# Patient Record
Sex: MALE | Race: OTHER | Employment: UNEMPLOYED | URBAN - NONMETROPOLITAN AREA
[De-identification: names, ages, dates, MRNs, and addresses within clinical notes are randomized per-mention and may not be internally consistent; named-entity substitution may affect disease eponyms.]

---

## 2021-05-19 ENCOUNTER — HOSPITAL ENCOUNTER (EMERGENCY)
Age: 51
Discharge: HOME | End: 2021-05-20
Attending: EMERGENCY MEDICINE

## 2021-05-19 ENCOUNTER — APPOINTMENT (OUTPATIENT)
Dept: GENERAL RADIOLOGY | Age: 51
End: 2021-05-19

## 2021-05-19 DIAGNOSIS — J20.8 ACUTE BRONCHITIS DUE TO OTHER SPECIFIED ORGANISMS: Primary | ICD-10-CM

## 2021-05-19 DIAGNOSIS — J06.9 ACUTE UPPER RESPIRATORY INFECTION: ICD-10-CM

## 2021-05-19 DIAGNOSIS — R05.9 COUGH: ICD-10-CM

## 2021-05-19 PROCEDURE — 87635 SARS-COV-2 COVID-19 AMP PRB: CPT

## 2021-05-19 PROCEDURE — 71045 X-RAY EXAM CHEST 1 VIEW: CPT

## 2021-05-19 PROCEDURE — 99285 EMERGENCY DEPT VISIT HI MDM: CPT

## 2021-05-19 RX ORDER — IPRATROPIUM BROMIDE AND ALBUTEROL SULFATE 2.5; .5 MG/3ML; MG/3ML
1 SOLUTION RESPIRATORY (INHALATION) ONCE
Status: COMPLETED | OUTPATIENT
Start: 2021-05-19 | End: 2021-05-20

## 2021-05-19 RX ORDER — PREDNISONE 20 MG/1
60 TABLET ORAL ONCE
Status: COMPLETED | OUTPATIENT
Start: 2021-05-19 | End: 2021-05-20

## 2021-05-19 ASSESSMENT — PAIN DESCRIPTION - DESCRIPTORS: DESCRIPTORS: TIGHTNESS

## 2021-05-19 ASSESSMENT — PAIN DESCRIPTION - LOCATION: LOCATION: CHEST

## 2021-05-19 NOTE — LETTER
[facility] Emergency Department  [address] [place] [address]  Phone: [phone]  Fax: [phone]               May 20, 2021    Patient: Leona Dominguez   YOB: 1970   Date of Visit: 5/19/2021       To Whom It May Concern:    Leona Dominguez was seen and treated in our emergency department on 5/19/2021. He may return to work on 05/21/2020.       Sincerely,       Miguel Adam RN         Signature:__________________________________

## 2021-05-20 VITALS
TEMPERATURE: 99.1 F | WEIGHT: 246 LBS | DIASTOLIC BLOOD PRESSURE: 75 MMHG | BODY MASS INDEX: 35.22 KG/M2 | SYSTOLIC BLOOD PRESSURE: 108 MMHG | HEIGHT: 70 IN | HEART RATE: 66 BPM | OXYGEN SATURATION: 99 % | RESPIRATION RATE: 8 BRPM

## 2021-05-20 LAB
SARS-COV-2, NAAT: NOT DETECTED
SOURCE: NORMAL

## 2021-05-20 PROCEDURE — 94640 AIRWAY INHALATION TREATMENT: CPT

## 2021-05-20 PROCEDURE — 6370000000 HC RX 637 (ALT 250 FOR IP): Performed by: EMERGENCY MEDICINE

## 2021-05-20 RX ORDER — BROMPHENIRAMINE MALEATE, PSEUDOEPHEDRINE HYDROCHLORIDE, AND DEXTROMETHORPHAN HYDROBROMIDE 2; 30; 10 MG/5ML; MG/5ML; MG/5ML
5 SYRUP ORAL 4 TIMES DAILY PRN
Qty: 1 BOTTLE | Refills: 0 | Status: SHIPPED | OUTPATIENT
Start: 2021-05-20 | End: 2022-03-14 | Stop reason: ALTCHOICE

## 2021-05-20 RX ORDER — AZITHROMYCIN 250 MG/1
TABLET, FILM COATED ORAL
Qty: 1 PACKET | Refills: 0 | Status: SHIPPED | OUTPATIENT
Start: 2021-05-20 | End: 2021-05-30

## 2021-05-20 RX ORDER — METHYLPREDNISOLONE 4 MG/1
TABLET ORAL
Qty: 1 KIT | Refills: 0 | Status: SHIPPED | OUTPATIENT
Start: 2021-05-20 | End: 2022-03-14 | Stop reason: ALTCHOICE

## 2021-05-20 RX ORDER — ALBUTEROL SULFATE 90 UG/1
2 AEROSOL, METERED RESPIRATORY (INHALATION) 4 TIMES DAILY PRN
Qty: 1 INHALER | Refills: 0 | Status: SHIPPED | OUTPATIENT
Start: 2021-05-20 | End: 2022-03-14 | Stop reason: ALTCHOICE

## 2021-05-20 RX ORDER — AZITHROMYCIN 250 MG/1
500 TABLET, FILM COATED ORAL ONCE
Status: COMPLETED | OUTPATIENT
Start: 2021-05-20 | End: 2021-05-20

## 2021-05-20 RX ADMIN — IPRATROPIUM BROMIDE AND ALBUTEROL SULFATE 1 AMPULE: .5; 3 SOLUTION RESPIRATORY (INHALATION) at 00:13

## 2021-05-20 RX ADMIN — AZITHROMYCIN MONOHYDRATE 500 MG: 250 TABLET ORAL at 01:04

## 2021-05-20 RX ADMIN — PREDNISONE 60 MG: 20 TABLET ORAL at 00:44

## 2021-05-20 ASSESSMENT — ENCOUNTER SYMPTOMS
WHEEZING: 1
SORE THROAT: 1
SWOLLEN GLANDS: 0
EYES NEGATIVE: 1
COUGH: 1
GASTROINTESTINAL NEGATIVE: 1
RHINORRHEA: 1
SINUS PAIN: 1

## 2021-05-20 NOTE — ED PROVIDER NOTES
The history is provided by the patient. URI  Presenting symptoms: congestion, cough, fever, rhinorrhea and sore throat    Severity:  Moderate  Onset quality:  Gradual  Timing:  Constant  Chronicity:  New  Relieved by:  Nothing  Worsened by:  Nothing  Ineffective treatments:  None tried  Associated symptoms: sinus pain, sneezing and wheezing    Associated symptoms: no arthralgias, no headaches, no myalgias, no neck pain and no swollen glands    Associated symptoms comment:  Chest tightness and cough since receiving 1st dose of COVID vaccine x1 week ago. Patient states he has felt hot but has not measured temperature      Review of Systems   Constitutional: Positive for fever. HENT: Positive for congestion, rhinorrhea, sinus pain, sneezing and sore throat. Eyes: Negative. Respiratory: Positive for cough and wheezing. Cardiovascular: Negative. Gastrointestinal: Negative. Genitourinary: Negative. Musculoskeletal: Negative. Negative for arthralgias, myalgias and neck pain. Skin: Negative. Neurological: Negative. Negative for headaches. All other systems reviewed and are negative. History reviewed. No pertinent family history.   Social History     Socioeconomic History    Marital status:      Spouse name: Not on file    Number of children: Not on file    Years of education: Not on file    Highest education level: Not on file   Occupational History    Not on file   Tobacco Use    Smoking status: Never Smoker    Smokeless tobacco: Never Used   Vaping Use    Vaping Use: Never used   Substance and Sexual Activity    Alcohol use: Never    Drug use: Never    Sexual activity: Not on file   Other Topics Concern    Not on file   Social History Narrative    Not on file     Social Determinants of Health     Financial Resource Strain:     Difficulty of Paying Living Expenses:    Food Insecurity:     Worried About Running Out of Food in the Last Year:     [address] in the Last Year:    Transportation Needs:     Lack of Transportation (Medical):  Lack of Transportation (Non-Medical):    Physical Activity:     Days of Exercise per Week:     Minutes of Exercise per Session:    Stress:     Feeling of Stress :    Social Connections:     Frequency of Communication with Friends and Family:     Frequency of Social Gatherings with Friends and Family:     Attends Holiness Services:     Active Member of Clubs or Organizations:     Attends Club or Organization Meetings:     Marital Status:    Intimate Partner Violence:     Fear of Current or Ex-Partner:     Emotionally Abused:     Physically Abused:     Sexually Abused:      History reviewed. No pertinent surgical history. History reviewed. No pertinent past medical history. No Known Allergies  Prior to Admission medications    Medication Sig Start Date End Date Taking? Authorizing Provider   azithromycin (ZITHROMAX) 250 MG tablet Take 2 tablets (500 mg) on Day 1, followed by 1 tablet (250 mg) once daily on Days 2 through 5. 5/20/21 5/30/21 Yes Cuong Plaster, DO   brompheniramine-pseudoephedrine-DM 2-30-10 MG/5ML syrup Take 5 mLs by mouth 4 times daily as needed for Congestion or Cough 5/20/21  Yes Migdalia Rodriguez DO   albuterol sulfate HFA (VENTOLIN HFA) 108 (90 Base) MCG/ACT inhaler Inhale 2 puffs into the lungs 4 times daily as needed for Wheezing 5/20/21  Yes Migdalia Rodriguez DO   methylPREDNISolone (MEDROL, SRAAI,) 4 MG tablet Take by mouth. 5/20/21  Yes Migdalia Rodriguez DO       /63   Pulse 78   Temp 99.1 °F (37.3 °C) (Oral)   Resp 15   Ht 5' 10\" (1.778 m)   Wt 246 lb (111.6 kg)   SpO2 96%   BMI 35.30 kg/m²     Physical Exam  Vitals and nursing note reviewed. Constitutional:       Appearance: He is well-developed. HENT:      Head: Normocephalic and atraumatic. Right Ear: External ear normal.      Left Ear: External ear normal.      Nose: Mucosal edema and rhinorrhea present.    Eyes: Conjunctiva/sclera: Conjunctivae normal.      Pupils: Pupils are equal, round, and reactive to light. Cardiovascular:      Rate and Rhythm: Normal rate and regular rhythm. Heart sounds: Normal heart sounds. Pulmonary:      Effort: Pulmonary effort is normal.      Breath sounds: Decreased air movement present. Decreased breath sounds and wheezing present. Comments: Wheezing clears with cough  Abdominal:      General: Bowel sounds are normal.      Palpations: Abdomen is soft. Musculoskeletal:         General: Normal range of motion. Cervical back: Normal range of motion and neck supple. Skin:     General: Skin is warm and dry. Neurological:      Mental Status: He is alert and oriented to person, place, and time. GCS: GCS eye subscore is 4. GCS verbal subscore is 5. GCS motor subscore is 6. Psychiatric:         Behavior: Behavior normal.         Thought Content: Thought content normal.         Judgment: Judgment normal.         MDM:    Labs Reviewed   COVID-19, RAPID       XR CHEST PORTABLE   Preliminary Result   No focal consolidation. Duoneb in ER and prednisone. Supportive care and the patient feels improved. My typical dicussion, presentation,and considerations for this patients' chief complaint, diagnosis, and differential diagnosis have been considered and discussed. I have stressed need for follow up and reexamination for this encounter. The patient  was informed to follow up [facility] The patient  was also told to return to the emergency department if any changes or any concern. Patient  questions and concerns from this visit have been addressed prior to discharge. Patient was  prescribed medication. The medication(s) use,  medication(s) safety and medication(s) interactions with already prescribed medication(s) have been explained and outlined for this encounter.  The patient  was educated that it is their responsibility to verify this information is correct at the time

## 2021-05-20 NOTE — ED TRIAGE NOTES
Arrived ambulatory to room 1 for triage. Tolerated without difficulty. Bed in lowest position. Call light given. Gowned for exam, placed on cardiac monitor.

## 2021-08-12 ENCOUNTER — HOSPITAL ENCOUNTER (OUTPATIENT)
Dept: PHYSICAL THERAPY | Age: 51
Setting detail: THERAPIES SERIES
Discharge: HOME | End: 2021-08-12
Payer: COMMERCIAL

## 2021-08-12 PROCEDURE — 97161 PT EVAL LOW COMPLEX 20 MIN: CPT

## 2021-08-12 ASSESSMENT — PAIN DESCRIPTION - PAIN TYPE: TYPE: ACUTE PAIN

## 2021-08-12 ASSESSMENT — PAIN SCALES - GENERAL: PAINLEVEL_OUTOF10: 5

## 2021-08-13 ASSESSMENT — PAIN DESCRIPTION - PAIN TYPE: TYPE: ACUTE PAIN

## 2021-08-13 ASSESSMENT — PAIN DESCRIPTION - LOCATION: LOCATION: BACK

## 2021-08-13 ASSESSMENT — PAIN - FUNCTIONAL ASSESSMENT: PAIN_FUNCTIONAL_ASSESSMENT: PREVENTS OR INTERFERES SOME ACTIVE ACTIVITIES AND ADLS

## 2021-08-13 ASSESSMENT — PAIN DESCRIPTION - FREQUENCY: FREQUENCY: CONTINUOUS

## 2021-08-13 ASSESSMENT — PAIN DESCRIPTION - ORIENTATION: ORIENTATION: LOWER

## 2021-08-13 ASSESSMENT — PAIN SCALES - GENERAL: PAINLEVEL_OUTOF10: 5

## 2021-08-13 ASSESSMENT — PAIN DESCRIPTION - PROGRESSION: CLINICAL_PROGRESSION: NOT CHANGED

## 2021-08-13 NOTE — FLOWSHEET NOTE
providers:        Assessment:  (Response towards treatment session) (Pain Rating)      Pt tolerated  treatment without any adverse reactions or complications this date.  . Pt would continue to benefit from skilled therapy interventions to address remaining impairments, improve mobility and strength,  and progress toward goal completion and prepare for d/c including finalizing HEP ; .  Pain complaints after session 5/10      Plan for Next Session:  bridges; prone hip EXT      Time In / Time Out:           If BWC Please Indicate Time In/Out/Total Time  CPT Code Time in Time out Total Time   PT oriana  930  1012  42                                                   Total for session      42       Timed Code/Total Treatment Minutes:  PT eval 43'    Next Progress Note due:        Plan of Care Interventions:  [x] Therapeutic Exercise  [] Modalities:  [x] Therapeutic Activity     [] Ultrasound  [] Estim  [] Gait Training      [] Cervical Traction [] Lumbar Traction  [x] Neuromuscular Re-education    [] Cold/hotpack [] Iontophoresis   [x] Instruction in HEP      [] Vasopneumatic   [] Dry Needling    X Manual Therapy               [] Aquatic Therapy              Electronically signed by:  Veronica Mabry PT, 8/13/2021, 1:42 PM

## 2021-08-13 NOTE — PROGRESS NOTES
Physical Therapy  Initial Assessment  Date: 2021- eval performed 21  Patient Name: Deepthi Echeverria  MRN: [id number]  : 1970     Treatment Diagnosis: LBP    Restrictions  Position Activity Restriction  Other position/activity restrictions: no lifting more than 10#, no pushing/pulling more than 10#; limits forward bending    Subjective   General  Chart Reviewed: Yes  Patient assessed for rehabilitation services?: Yes  Referring Practitioner: Wilmer Woody  Diagnosis: lumbar strain  Follows Commands: Within Functional Limits  General Comment  Comments: L shoulder  PT Visit Information  PT Insurance Information: [facility]  Subjective  Subjective: onset of LBP - related to lifting boxes @ [employer] started about 2 1.2 to 3 weeks ago-; has been working @ [employer] for about 3 months-no overall improvement @ this time; - temporary relief with meds from doctor and ice pack  Pain Screening  Patient Currently in Pain: Yes  Pain Assessment  Pain Assessment: 0-10  Pain Level: 5  Pain Type: Acute pain  Pain Location: Back  Pain Orientation: Lower  Pain Frequency: Continuous  Clinical Progression: Not changed  Functional Pain Assessment: Prevents or interferes some active activities and ADLs  Vital Signs  Patient Currently in Pain: Yes    Vision/Hearing  Vision  Vision: Within Functional Limits  Hearing  Hearing: Within functional limits    Orientation  Orientation  Overall Orientation Status: Within Normal Limits    Social/Functional History       Objective     Observation/Palpation  Posture: Fair  Palpation: lumbar spine TTP    PROM RLE (degrees)  RLE General PROM: hip FLEX painful  PROM LLE (degrees)  LLE General PROM: hip FLEX painful                           Ambulation  Ambulation?: Yes  Ambulation 1  Device: No Device  Gait Deviations: None                            Assessment   Conditions Requiring Skilled Therapeutic Intervention  Body structures, Functions, Activity limitations: Increased pain;Decreased ROM  Treatment

## 2021-08-13 NOTE — PLAN OF CARE
Outpatient Physical Therapy           [place]           [] Phone: [phone]   Fax: [phone]  [place]           [x] Phone: [phone]   Fax: [phone]     To: Referring Practitioner: Ryder Kathleen    From: Stacie Hedrick PT, PT     Patient: Dulce Hassan       : 1970  Diagnosis: S39.012A low back strain  Treatment Diagnosis: Treatment Diagnosis: LBP   Date: 2021    Physical Therapy Certification/Re-Certification Form    The following patient has been evaluated for physical therapy services and for therapy to continue, insurance requires physician review of the treatment plan initially and every 90 days. Please review the attached evaluation and/or summary of the patient's plan of care, and verify that you agree therapy should continue by signing the attached document and sending it back to our office.     Assessment:      patient injured back @ work- now working with restrictions- patient prefers exercise with extension bias  Plan of Care/Treatment to date:  [x] Therapeutic Exercise  [] Modalities:  [x] Therapeutic Activity     [] Ultrasound  [] Electrical Stimulation  [] Gait Training      [] Cervical Traction [] Lumbar Traction  [x] Neuromuscular Re-education    [] Cold/hotpack [] Iontophoresis   [x] Instruction in HEP      [] Vasopneumatic    [] Dry Needling  [x] Manual Therapy               [] Aquatic Therapy       Other:          Frequency/Duration:  # Days per week: [] 1 day # Weeks: [x] 10     [x] 2 days   []      [x] 3 days   []      [] 4 days   []         []          []     Rehab Potential/Progress: [] Excellent [x] Good [] Fair  [] Poor     Goals:       Short term goals  Time Frame for Short term goals: 10  sessions  Short term goal 1: patient will report LBP no greater than 1/10 lbp  Short term goal 2: patient will be compliant with HEP         Electronically signed by:  Stacie Hedrick PT, , 2021, 1:25 PM        If you have any questions or concerns, please don't hesitate to call.  Thank you for your referral.      Physician Signature:________________________________Date:_________ TIME: _____  By signing above, therapists plan is approved by physician

## 2021-08-16 ENCOUNTER — HOSPITAL ENCOUNTER (OUTPATIENT)
Dept: PHYSICAL THERAPY | Age: 51
Setting detail: THERAPIES SERIES
Discharge: HOME | End: 2021-08-16
Payer: COMMERCIAL

## 2021-08-16 PROCEDURE — 97110 THERAPEUTIC EXERCISES: CPT

## 2021-08-16 NOTE — FLOWSHEET NOTE
Outpatient Physical Therapy  [place]           [] Phone: [phone]   Fax: [phone]  [place] diaz           [x] Phone: [phone]   Fax: [phone]        Physical Therapy Daily Treatment Note  Date:  2021    Patient Name:  Yonathan Mckeon    :  1970  MRN: [id number]  Restrictions/Precautions: Other position/activity restrictions: no lifting more than 10#, no pushing/pulling more than 10#; limits forward bending  Diagnosis: S39.012A low back strain  Treatment Diagnosis: Treatment Diagnosis: LBP   Insurance/Certification information: PT Insurance Information: [facility] 10 session by 21  Referring Physician:  Referring Practitioner: Bradford Wilde  Next Doctor Visit:    Plan of care signed (Y/N):    Outcome Measure:   Visit# / total visits:  2/10  Pain level: 7/10   Goals:       Time Frame for Short term goals: 10  sessions  Short term goal 1: patient will report LBP no greater than 1/10 lbp  Short term goal 2: patient will be compliant with HEP         Summary of Evaluation:       patient injured back @ work- now working with restrictions- patient prefers exercise with extension bias    Subjective:   Patient reports of 7/10 pain upon arrival and voices no new c/o. Any changes in Ambulatory Summary Sheet? None        Objective:   Discomfort with TM noted    COVID screening questions were asked and patient attested that there had been no contact or symptoms        Exercises: (No more than 4 columns)   Exercise/Equipment Date 21 Date 2021 Date      PT eval, HEP instruct     WARM UP      Treadmill     2. 5mph 5'          TABLE      Prone prop  5'    Prone press ups  10x    Prone hip ext  10x B    Quadriped oppos arm/leg  10x B    Bridge   10x                                                  STANDING      Scap ret  GTB 10x3\"    LAE  GTB 10x3\"    Lat pull   GTB 10x3\"                                                               PROPRIOCEPTION                                    MODALITIES

## 2021-08-23 ENCOUNTER — HOSPITAL ENCOUNTER (OUTPATIENT)
Dept: PHYSICAL THERAPY | Age: 51
Setting detail: THERAPIES SERIES
Discharge: HOME | End: 2021-08-23
Payer: COMMERCIAL

## 2021-08-23 PROCEDURE — 97110 THERAPEUTIC EXERCISES: CPT

## 2021-08-23 NOTE — FLOWSHEET NOTE
laps ea way                                                  PROPRIOCEPTION                                    MODALITIES                      Other Therapeutic Activities/Education:        Home Exercise Program:  Prone prop twice daily @ home; focus on lumbar lordosis when sitting, frequent standing BB when @ work- patient expressed understanding      Manual Treatments:        Modalities:        Communication with other providers:        Assessment:  (Response towards treatment session) (Pain Rating)         Pt tolerated  treatment without any adverse reactions or complications this date.  . Pt would continue to benefit from skilled therapy interventions to address remaining impairments, improve mobility and strength,  and progress toward goal completion and prepare for d/c including finalizing HEP ; .  Pain complaints after session 7/10      Plan for Next Session:  bridges; prone hip EXT      Time In / Time Out:   0928/1006       If BWC Please Indicate Time In/Out/Total Time  CPT Code Time in Time out Total Time   TE  0928 1006   38                                                   Total for session       38       Timed Code/Total Treatment Minutes:  38te     Next Progress Note due:        Plan of Care Interventions:  [x] Therapeutic Exercise  [] Modalities:  [x] Therapeutic Activity     [] Ultrasound  [] Estim  [] Gait Training      [] Cervical Traction [] Lumbar Traction  [x] Neuromuscular Re-education    [] Cold/hotpack [] Iontophoresis   [x] Instruction in HEP      [] Vasopneumatic   [] Dry Needling    X Manual Therapy               [] Aquatic Therapy              Electronically signed by:  Kristen Villasenor 8/23/2021, 9:28 AM

## 2021-08-24 ENCOUNTER — HOSPITAL ENCOUNTER (OUTPATIENT)
Dept: PHYSICAL THERAPY | Age: 51
Setting detail: THERAPIES SERIES
Discharge: HOME | End: 2021-08-24
Payer: COMMERCIAL

## 2021-08-24 PROCEDURE — 97110 THERAPEUTIC EXERCISES: CPT

## 2021-08-24 NOTE — FLOWSHEET NOTE
Outpatient Physical Therapy  [place]           [] Phone: [phone]   Fax: [phone]  [place] Marielena           [x] Phone: [phone]   Fax: [phone]        Physical Therapy Daily Treatment Note  Date:  2021    Patient Name:  Melania Campbell  :  1970  MRN: [id number]  Restrictions/Precautions: Other position/activity restrictions: no lifting more than 10#, no pushing/pulling more than 10#; limits forward bending  Diagnosis: S39.012A low back strain  Treatment Diagnosis: Treatment Diagnosis: LBP   Insurance/Certification information: PT Insurance Information: [facility] 10 session by 21  Referring Physician:  Referring Practitioner: Abi Ennis Doctor Visit:    Plan of care signed (Y/N):    Outcome Measure:   Visit# / total visits:  4/10  Pain level: 7/10   Goals:       Time Frame for Short term goals: 10  sessions  Short term goal 1: patient will report LBP no greater than 1/10 lbp  Short term goal 2: patient will be compliant with HEP         Summary of Evaluation:       patient injured back @ work- now working with restrictions- patient prefers exercise with extension bias    Subjective:   Patient reports of 7/10 pain upon arrival and voices no new c/o. Any changes in Ambulatory Summary Sheet? None        Objective:  No change in pain at end of session    COVID screening questions were asked and patient attested that there had been no contact or symptoms        Exercises: (No more than 4 columns)   Exercise/Equipment Date 21 Date 2021 Date 2021      PT eval, HEP instruct      WARM UP       Treadmill     2. 5mph 5' 2. 3mph  5' 7'  2.5mph          TABLE       Prone prop  5' 5' 5'   Prone press ups  10x 2x10 2x10   Prone hip ext  10x B 2x10 B 2x10 B   Quadriped oppos arm/leg  10x B 10x B 10x B   Bridge   10x  2x10 2x10                                                       STANDING       Scap ret  GTB 10x3\" GTB 2x10 3\" PTT 2x10 3\"   LAE  GTB 10x3\" GTB 2x10

## 2021-10-13 ENCOUNTER — HOSPITAL ENCOUNTER (OUTPATIENT)
Dept: PHYSICAL THERAPY | Age: 51
Setting detail: THERAPIES SERIES
Discharge: HOME | End: 2021-10-13
Payer: COMMERCIAL

## 2021-10-13 PROCEDURE — 97110 THERAPEUTIC EXERCISES: CPT

## 2021-10-13 NOTE — PROGRESS NOTES
Physical Therapy  [facility] extended PT to 10/31/21.  Sharon Cohen, PT, 10/13/2021,  9:00 AM

## 2021-10-13 NOTE — FLOWSHEET NOTE
Outpatient Physical Therapy  [place]           [] Phone: [phone]   Fax: [phone]  [place]           [x] Phone: [phone]   Fax: [phone]        Physical Therapy Daily Treatment Note  Date:  10/13/2021    Patient Name:  Yung Ram  :  1970  MRN: [id number]  Restrictions/Precautions: Other position/activity restrictions: no lifting more than 10#, no pushing/pulling more than 10#; limits forward bending  Diagnosis: S39.012A low back strain  Treatment Diagnosis: Treatment Diagnosis: LBP   Insurance/Certification information: PT Insurance Information: [id number] 10 session by 10/31/21  Referring Physician:  Referring Practitioner: Jayson Funk  Next Doctor Visit:    Plan of care signed (Y/N):    Outcome Measure:   Visit# / total visits: 5/10  Pain level: 4/10   Goals:       Time Frame for Short term goals: 10  sessions  Short term goal 1: patient will report LBP no greater than 1/10 lbp  Short term goal 2: patient will be compliant with HEP         Summary of Evaluation:       patient injured back @ work- now working with restrictions- patient prefers exercise with extension bias    Subjective:   Patient reports of 4/10 pain upon arrival and reports he's having good days and bad days, but the bad is out weighing the good right now. Any changes in Ambulatory Summary Sheet? None        Objective: Increase pain noted at end of session    COVID screening questions were asked and patient attested that there had been no contact or symptoms        Exercises: (No more than 4 columns)   Exercise/Equipment Date 2021 2021 10/13/21           WARM UP      Treadmill    2. 3mph  5' 7'  2.5mph 7'  2.5mph         TABLE      Prone prop 5' 5' 5'   Prone press ups 2x10 2x10 2x10    Prone hip ext 2x10 B 2x10 B 2x10 B   Quadriped oppos arm/leg 10x B 10x B 10x B   Bridge  2x10 2x10 2x10                                                STANDING      Scap ret GTB 2x10 3\" PTT 2x10 3\" PTT 2x10 3\" LAE GTB 2x10 3\" PTT 2x10 3\" PTT 2x10 3\"   Lat pull  GTB 2x10 3\" PTT 2x10 3\" PTT 2x10 3\"         FM lateral walk outs 10# 5 laps ea way 10# 5 laps ea way 10# 5 laps ea way                                                  PROPRIOCEPTION                                    MODALITIES                      Other Therapeutic Activities/Education:        Home Exercise Program:  Prone prop twice daily @ home; focus on lumbar lordosis when sitting, frequent standing BB when @ work- patient expressed understanding      Manual Treatments:        Modalities:        Communication with other providers:        Assessment:  (Response towards treatment session) (Pain Rating)         Pt tolerated  treatment without any adverse reactions or complications this date.  . Pt would continue to benefit from skilled therapy interventions to address remaining impairments, improve mobility and strength,  and progress toward goal completion and prepare for d/c including finalizing HEP ; .  Pain complaints after session 6-7/10      Plan for Next Session:  hector; prone hip EXT      Time In / Time Out:   2452/8014       If BWC Please Indicate Time In/Out/Total Time  CPT Code Time in Time out Total Time   TE  1602 1634       32                                                   Total for session               Timed Code/Total Treatment Minutes:   32te    Next Progress Note due:        Plan of Care Interventions:  [x] Therapeutic Exercise  [] Modalities:  [x] Therapeutic Activity     [] Ultrasound  [] Estim  [] Gait Training      [] Cervical Traction [] Lumbar Traction  [x] Neuromuscular Re-education    [] Cold/hotpack [] Iontophoresis   [x] Instruction in HEP      [] Vasopneumatic   [] Dry Needling      Manual Therapy               [] Aquatic Therapy              Electronically signed by:  Robert Ponce PT 10/13/2021, 4:02 PM

## 2021-10-15 ENCOUNTER — HOSPITAL ENCOUNTER (OUTPATIENT)
Dept: PHYSICAL THERAPY | Age: 51
Setting detail: THERAPIES SERIES
Discharge: HOME | End: 2021-10-15
Payer: COMMERCIAL

## 2021-10-15 PROCEDURE — 97110 THERAPEUTIC EXERCISES: CPT

## 2021-10-15 NOTE — FLOWSHEET NOTE
Outpatient Physical Therapy  [place]           [] Phone: [phone]   Fax: [phone]  Lavonne Gonzáles           [x] Phone: [phone]   Fax: [phone]        Physical Therapy Daily Treatment Note  Date:  10/15/2021    Patient Name:  Jovan Rueda  :  1970  MRN: [id number]  Restrictions/Precautions: Other position/activity restrictions: no lifting more than 10#, no pushing/pulling more than 10#; limits forward bending  Diagnosis: S39.012A low back strain  Treatment Diagnosis: Treatment Diagnosis: LBP   Insurance/Certification information: PT Insurance Information: [facility] 10 session by 10/31/21  Referring Physician:  Referring Practitioner: Eden Roberson  Next Doctor Visit:    Plan of care signed (Y/N):    Outcome Measure:   Visit# / total visits: 6/10  Pain level: 6/10   Goals:       Time Frame for Short term goals: 10  sessions  Short term goal 1: patient will report LBP no greater than 1/10 lbp  Short term goal 2: patient will be compliant with HEP         Summary of Evaluation:       patient injured back @ work- now working with restrictions- patient prefers exercise with extension bias    Subjective:   Patient reports of 6/10 pain upon arrival and reports he worked today though as well. Any changes in Ambulatory Summary Sheet? None        Objective: Increase pain noted at end of session    COVID screening questions were asked and patient attested that there had been no contact or symptoms        Exercises: (No more than 4 columns)   Exercise/Equipment Date 2021 2021 10/13/21 10/15/21            WARM UP       Treadmill    2. 3mph  5' 7'  2.5mph 7'  2.5mph 8' 2.5mph          TABLE       Prone prop 5' 5' 5' 5'   Prone press ups 2x10 2x10 2x10  2x10    Prone hip ext 2x10 B 2x10 B 2x10 B 2x10 B   Quadriped oppos arm/leg 10x B 10x B 10x B 10x B   Bridge  2x10 2x10 2x10 2x10                                                       STANDING       Scap ret GTB 2x10 3\" PTT 2x10 3\" PTT 2x10 3\" PTT 2x10 3\"   LAE GTB 2x10 3\" PTT 2x10 3\" PTT 2x10 3\" PTT 2x10 3\"   Lat pull  GTB 2x10 3\" PTT 2x10 3\" PTT 2x10 3\" PTT 2x10 3\"          FM lateral walk outs 10# 5 laps ea way 10# 5 laps ea way 10# 5 laps ea way 10# 10 laps ea way                                                         PROPRIOCEPTION                                          MODALITIES                         Other Therapeutic Activities/Education:        Home Exercise Program:  Prone prop twice daily @ home; focus on lumbar lordosis when sitting, frequent standing BB when @ work- patient expressed understanding      Manual Treatments:        Modalities:        Communication with other providers:        Assessment:  (Response towards treatment session) (Pain Rating)         Pt tolerated  treatment without any adverse reactions or complications this date.  . Pt would continue to benefit from skilled therapy interventions to address remaining impairments, improve mobility and strength,  and progress toward goal completion and prepare for d/c including finalizing HEP ; .  Pain complaints after session 6-7/10      Plan for Next Session:  hector; prone hip EXT      Time In / Time Out:   6050/6081       If [facility] Please Indicate Time In/Out/Total Time  CPT Code Time in Time out Total Time   TE  7571 5729      39                                                     Total for session       39te        Timed Code/Total Treatment Minutes:   39te     Next Progress Note due:        Plan of Care Interventions:  [x] Therapeutic Exercise  [] Modalities:  [x] Therapeutic Activity     [] Ultrasound  [] Estim  [] Gait Training      [] Cervical Traction [] Lumbar Traction  [x] Neuromuscular Re-education    [] Cold/hotpack [] Iontophoresis   [x] Instruction in HEP      [] Vasopneumatic   [] Dry Needling      Manual Therapy               [] Aquatic Therapy              Electronically signed by:  Alesia Martinez  10/15/2021, 4:05 PM

## 2021-10-18 ENCOUNTER — HOSPITAL ENCOUNTER (OUTPATIENT)
Dept: PHYSICAL THERAPY | Age: 51
Setting detail: THERAPIES SERIES
Discharge: HOME | End: 2021-10-18
Payer: COMMERCIAL

## 2021-10-18 PROCEDURE — 97110 THERAPEUTIC EXERCISES: CPT

## 2021-10-18 NOTE — FLOWSHEET NOTE
Outpatient Physical Therapy  [place]           [] Phone: [phone]   Fax: [phone]  Rosaline diaz           [x] Phone: [phone]   Fax: [phone]        Physical Therapy Daily Treatment Note  Date:  10/18/2021    Patient Name:  Benson Bunn  :  1970  MRN: [id number]  Restrictions/Precautions: Other position/activity restrictions: no lifting more than 10#, no pushing/pulling more than 10#; limits forward bending  Diagnosis: S39.012A low back strain  Treatment Diagnosis: Treatment Diagnosis: LBP   Insurance/Certification information: PT Insurance Information: [facility] 10 session by 10/31/21  Referring Physician:  Referring Practitioner: Clarisse Ennis Doctor Visit:    Plan of care signed (Y/N):    Outcome Measure:   Visit# / total visits: 7/10  Pain level: 3-4/10   Goals:       Time Frame for Short term goals: 10  sessions  Short term goal 1: patient will report LBP no greater than 1/10 lbp  Short term goal 2: patient will be compliant with HEP         Summary of Evaluation:       patient injured back @ work- now working with restrictions- patient prefers exercise with extension bias    Subjective:   Patient reports of 6/10 pain upon arrival and reports he worked today though as well. Any changes in Ambulatory Summary Sheet?   None        Objective:   Less pain when up and mobile unless he's to busy then his pain goes up   But when he's sedentary to long he has more pain     COVID screening questions were asked and patient attested that there had been no contact or symptoms        Exercises: (No more than 4 columns)   Exercise/Equipment 10/13/21 10/15/21 10/18/21           WARM UP      Treadmill    7'  2.5mph 8' 2.5mph 9' 2.5mph         TABLE      Prone prop 5' 5' 5'   Prone press ups 2x10  2x10  2x10   Prone hip ext 2x10 B 2x10 B 2x10 B   Quadriped oppos arm/leg 10x B 10x B 10x B   Bridge  2x10 2x10 2x10                                                STANDING      Scap ret PTT 2x10 3\" PTT 2x10 3\" ERIN 2x10 3\"   LAE PTT 2x10 3\" PTT 2x10 3\" ERIN 2x10 3\"   Lat pull  PTT 2x10 3\" PTT 2x10 3\" ERIN 2x10 3\"         FM lateral walk outs 10# 5 laps ea way 10# 10 laps ea way 10# 10 laps ea way                                                  PROPRIOCEPTION                                    MODALITIES                      Other Therapeutic Activities/Education:        Home Exercise Program:  Prone prop twice daily @ home; focus on lumbar lordosis when sitting, frequent standing BB when @ work- patient expressed understanding      Manual Treatments:        Modalities:        Communication with other providers:        Assessment:  (Response towards treatment session) (Pain Rating)         Pt tolerated  treatment without any adverse reactions or complications this date.  . Pt would continue to benefit from skilled therapy interventions to address remaining impairments, improve mobility and strength,  and progress toward goal completion and prepare for d/c including finalizing HEP ; .  Pain complaints after session 5/10      Plan for Next Session:  hector; prone hip EXT      Time In / Time Out:   1712/1750       If [facility] Please Indicate Time In/Out/Total Time  CPT Code Time in Time out Total Time   TE  1712 1750        38                                                     Total for session               Timed Code/Total Treatment Minutes:   38te     Next Progress Note due:        Plan of Care Interventions:  [x] Therapeutic Exercise  [] Modalities:  [x] Therapeutic Activity     [] Ultrasound  [] Estim  [] Gait Training      [] Cervical Traction [] Lumbar Traction  [x] Neuromuscular Re-education    [] Cold/hotpack [] Iontophoresis   [x] Instruction in HEP      [] Vasopneumatic   [] Dry Needling      Manual Therapy               [] Aquatic Therapy              Electronically signed by:  Rony Kay  10/18/2021, 5:09 PM

## 2021-10-20 ENCOUNTER — HOSPITAL ENCOUNTER (OUTPATIENT)
Dept: PHYSICAL THERAPY | Age: 51
Setting detail: THERAPIES SERIES
Discharge: HOME | End: 2021-10-20
Payer: COMMERCIAL

## 2021-10-20 PROCEDURE — 97110 THERAPEUTIC EXERCISES: CPT

## 2021-10-20 NOTE — FLOWSHEET NOTE
Outpatient Physical Therapy  [place]           [] Phone: [phone]   Fax: [phone]  Shania Dolan           [x] Phone: [phone]   Fax: [phone]        Physical Therapy Daily Treatment Note  Date:  10/20/2021    Patient Name:  Autumn Chau  :  1970  MRN: [id number]  Restrictions/Precautions: Other position/activity restrictions: no lifting more than 10#, no pushing/pulling more than 10#; limits forward bending  Diagnosis: S39.012A low back strain  Treatment Diagnosis: Treatment Diagnosis: LBP   Insurance/Certification information: PT Insurance Information: [facility] 10 session by 10/31/21  Referring Physician:  Referring Practitioner: Dank Ennis Doctor Visit:    Plan of care signed (Y/N):    Outcome Measure:   Visit# / total visits: 7/10  Pain level: 3-4/10   Goals:       Time Frame for Short term goals: 10  sessions  Short term goal 1: patient will report LBP no greater than 1/10 lbp  Short term goal 2: patient will be compliant with HEP         Summary of Evaluation:       patient injured back @ work- now working with restrictions- patient prefers exercise with extension bias    Subjective:   Patient reports 3-4/10 pain upon arrival reporting he did not work today and spent a lot of time sitting. Any changes in Ambulatory Summary Sheet?   None        Objective: Last 7 days highest 6-7/10  At best 3-4/10   Is pain free when relaxing but with movement he has some kind of pain    COVID screening questions were asked and patient attested that there had been no contact or symptoms        Exercises: (No more than 4 columns)   Exercise/Equipment 10/15/21 10/18/21 10/20/21           WARM UP      Treadmill    8' 2.5mph 9' 2.5mph 10' 2.5mph         TABLE      Prone prop 5' 5' 5'   Prone press ups 2x10  2x10 2x10   Prone hip ext 2x10 B 2x10 B 2x10 B   Quadriped oppos arm/leg 10x B 10x B 10x B   Bridge  2x10 2x10 3x10                                                STANDING      Scap ret PTT 2x10 3\" ERIN 2x10 3\" ERIN 2x10 3\"   LAE PTT 2x10 3\" ERIN 2x10 3\" ERIN 2x10 3\"   Lat pull  PTT 2x10 3\" ERIN 2x10 3\" ERIN 2x10 3\"         FM lateral walk outs 10# 10 laps ea way 10# 10 laps ea way 10# 10 laps ea way                                                  PROPRIOCEPTION                                    MODALITIES                      Other Therapeutic Activities/Education:        Home Exercise Program:  Prone prop twice daily @ home; focus on lumbar lordosis when sitting, frequent standing BB when @ work- patient expressed understanding      Manual Treatments:        Modalities:        Communication with other providers:        Assessment:  (Response towards treatment session) (Pain Rating)         Pt tolerated  treatment without any adverse reactions or complications this date.  . Pt would continue to benefit from skilled therapy interventions to address remaining impairments, improve mobility and strength,  and progress toward goal completion and prepare for d/c including finalizing HEP ; .  Pain complaints after session 5/10      Plan for Next Session:  bridges; prone hip EXT      Time In / Time Out:    1645/1724       If [facility] Please Indicate Time In/Out/Total Time  CPT Code Time in Time out Total Time   TE  1645  1724 39                                                   Total for session       39        Timed Code/Total Treatment Minutes:   39te     Next Progress Note due:        Plan of Care Interventions:  [x] Therapeutic Exercise  [] Modalities:  [x] Therapeutic Activity     [] Ultrasound  [] Estim  [] Gait Training      [] Cervical Traction [] Lumbar Traction  [x] Neuromuscular Re-education    [] Cold/hotpack [] Iontophoresis   [x] Instruction in HEP      [] Vasopneumatic   [] Dry Needling      Manual Therapy               [] Aquatic Therapy              Electronically signed by:  Nicki Vasquez  10/20/2021, 4:45 PM

## 2021-10-22 ENCOUNTER — HOSPITAL ENCOUNTER (OUTPATIENT)
Dept: PHYSICAL THERAPY | Age: 51
Setting detail: THERAPIES SERIES
Discharge: HOME | End: 2021-10-22
Payer: COMMERCIAL

## 2021-10-22 PROCEDURE — 97110 THERAPEUTIC EXERCISES: CPT

## 2021-10-22 NOTE — FLOWSHEET NOTE
Outpatient Physical Therapy  [place]           [] Phone: [phone]   Fax: [phone]  Deanna Gonzalez           [x] Phone: [phone]   Fax: [phone]        Physical Therapy Daily Treatment Note  Date:  10/22/2021    Patient Name:  Amos Shaffer  :  1970  MRN: [id number]  Restrictions/Precautions: Other position/activity restrictions: no lifting more than 10#, no pushing/pulling more than 10#; limits forward bending  Diagnosis: S39.012A low back strain  Treatment Diagnosis: Treatment Diagnosis: LBP   Insurance/Certification information: PT Insurance Information: [id number] 10 session by 10/31/21  Referring Physician:  Referring Practitioner: Yannick Cuevas  Next Doctor Visit:    Plan of care signed (Y/N):    Outcome Measure:   Visit# / total visits: 9/10  Pain level: 310   Goals:       Time Frame for Short term goals: 10  sessions  Short term goal 1: patient will report LBP no greater than 1/10 lbp  Short term goal 2: patient will be compliant with HEP         Summary of Evaluation:       patient injured back @ work- now working with restrictions- patient prefers exercise with extension bias    Subjective:   Patient reports 3/10 pain upon arrival.         Any changes in Ambulatory Summary Sheet? None        Objective: Last 7 days highest 6-7/10  At best 3-4/10   Is pain free when relaxing but with movement he has some kind of pain  Pt cued to relax shoulders during standing ex.'s. During therapy pt would take breaks to stretch to relieve pain.      COVID screening questions were asked and patient attested that there had been no contact or symptoms        Exercises: (No more than 4 columns)   Exercise/Equipment 10/18/21 10/20/21 10/22/21           WARM UP      Treadmill    9' 2.5mph 10' 2.5mph 10' 2.5mph         TABLE      Prone prop 5' 5' 5'   Prone press ups 2x10 2x10 2x10   Prone hip ext 2x10 B 2x10 B 2x10 B   Quadriped oppos arm/leg 10x B 10x B 10x B   Bridge  2x10 3x10 3x10 STANDING      Scap ret ERIN 2x10 3\" ERIN 2x10 3\" ERIN 2x10 3\"   LAE ERIN 2x10 3\" ERIN 2x10 3\" ERIN 2x10 3\"   Lat pull  ERIN 2x10 3\" ERIN 2x10 3\" ERIN 2x10 3\"         FM lateral walk outs 10# 10 laps ea way 10# 10 laps ea way 13# 10 laps ea way                                                  PROPRIOCEPTION                                    MODALITIES                      Other Therapeutic Activities/Education:        Home Exercise Program:  Prone prop twice daily @ home; focus on lumbar lordosis when sitting, frequent standing BB when @ work- patient expressed understanding      Manual Treatments:        Modalities:        Communication with other providers:        Assessment:  (Response towards treatment session) (Pain Rating)         Pt tolerated  treatment without any adverse reactions or complications this date. . Pt would continue to benefit from skilled therapy interventions to address remaining impairments, improve mobility and strength,  and progress toward goal completion and prepare for d/c including finalizing HEP ;   Pain complaints after session 0/10.        Plan for Next Session:  bridges; prone hip EXT      Time In / Time Out:   6036/5260           If [facility] Please Indicate Time In/Out/Total Time  CPT Code Time in Time out Total Time   TE  4517 1771  41                                                   Total for session       41         Timed Code/Total Treatment Minutes:  41',  3te    Next Progress Note due:        Plan of Care Interventions:  [x] Therapeutic Exercise  [] Modalities:  [x] Therapeutic Activity     [] Ultrasound  [] Estim  [] Gait Training      [] Cervical Traction [] Lumbar Traction  [x] Neuromuscular Re-education    [] Cold/hotpack [] Iontophoresis   [x] Instruction in HEP      [] Vasopneumatic   [] Dry Needling      Manual Therapy               [] Aquatic Therapy              Electronically signed by:  Laurent Raman, [address] [place] [address]  10/22/2021, 8:47 AM

## 2021-10-25 ENCOUNTER — HOSPITAL ENCOUNTER (OUTPATIENT)
Dept: PHYSICAL THERAPY | Age: 51
Setting detail: THERAPIES SERIES
Discharge: HOME | End: 2021-10-25
Payer: COMMERCIAL

## 2021-10-25 PROCEDURE — 97110 THERAPEUTIC EXERCISES: CPT

## 2021-10-25 NOTE — FLOWSHEET NOTE
Outpatient Physical Therapy  [place]           [] Phone: [phone]   Fax: [phone]  Gavi Beltran           [x] Phone: [phone]   Fax: [phone]        Physical Therapy Daily Treatment Note  Date:  10/25/2021    Patient Name:  Nevin Bassett  :  1970  MRN: [id number]  Restrictions/Precautions: Other position/activity restrictions: no lifting more than 10#, no pushing/pulling more than 10#; limits forward bending  Diagnosis: S39.012A low back strain  Treatment Diagnosis: Treatment Diagnosis: LBP   Insurance/Certification information: PT Insurance Information: [facility] 10 session by 10/31/21  Referring Physician:  Referring Practitioner: Mikaela Ennis Doctor Visit:    Plan of care signed (Y/N):    Outcome Measure:   Visit# / total visits: 9/10  Pain level: 4/10   Goals:       Time Frame for Short term goals: 10  sessions  Short term goal 1: patient will report LBP no greater than 1/10 lbp  Short term goal 2: patient will be compliant with HEP         Summary of Evaluation:       patient injured back @ work- now working with restrictions- patient prefers exercise with extension bias    Subjective:   Patient reports 4/10 pain upon arrival and says he did nothing to aggravate his pain over the weekend. Any changes in Ambulatory Summary Sheet? None        Objective: Last 7 days highest 6-7/10  At best 3-4/10   Is pain free when relaxing but with movement he has some kind of pain. Pt was able to complete all ex.'s without difficulty.      COVID screening questions were asked and patient attested that there had been no contact or symptoms        Exercises: (No more than 4 columns)   Exercise/Equipment 10/22/21 10/25/21          WARM UP     Treadmill    10' 2.5mph 10' 2.5mph        TABLE     Prone prop 5' 5'   Prone press ups 2x10 2x10   Prone hip ext 2x10 B 2x10 B   Quadriped oppos arm/leg 10x B 10x B   Bridge  3x10 3x10                                         STANDING     Scap ret ERIN 2x10 3\" ERIN 2x10 3\"   LAE ERIN 2x10 3\" ERIN 2x10 3\"   Lat pull  ERIN 2x10 3\" ERIN 2x10 3\"        FM lateral walk outs 13# 10 laps ea way 13# 10 laps ea way                                           PROPRIOCEPTION                              MODALITIES                   Other Therapeutic Activities/Education:        Home Exercise Program:  Prone prop twice daily @ home; focus on lumbar lordosis when sitting, frequent standing BB when @ work- patient expressed understanding      Manual Treatments:        Modalities:        Communication with other providers:        Assessment:  (Response towards treatment session) (Pain Rating)   Pt able to complete all ex.'s without difficulty or c/o pain. 4/10 pain at end of rx. Pt tolerated  treatment without any adverse reactions or complications this date. . Pt would continue to benefit from skilled therapy interventions to address remaining impairments, improve mobility and strength,  and progress toward goal completion and prepare for d/c including finalizing HEP.        Plan for Next Session:  hector; prone hip EXT      Time In / Time Out:  1030/1111         If [facility] Please Indicate Time In/Out/Total Time  CPT Code Time in Time out Total Time   TE  1030  1111   41                                                   Total for session       41          Timed Code/Total Treatment Minutes:  41'te    Next Progress Note due:        Plan of Care Interventions:  [x] Therapeutic Exercise  [] Modalities:  [x] Therapeutic Activity     [] Ultrasound  [] Estim  [] Gait Training      [] Cervical Traction [] Lumbar Traction  [x] Neuromuscular Re-education    [] Cold/hotpack [] Iontophoresis   [x] Instruction in HEP      [] Vasopneumatic   [] Dry Needling      Manual Therapy               [] Aquatic Therapy              Electronically signed by:  Nam Raman, [address] [place] [address]  10/25/2021, 10:30 AM

## 2021-11-18 ENCOUNTER — HOSPITAL ENCOUNTER (OUTPATIENT)
Dept: MRI IMAGING | Age: 51
Discharge: HOME | End: 2021-11-18

## 2021-11-18 DIAGNOSIS — S39.012A STRAIN OF LUMBAR REGION, INITIAL ENCOUNTER: ICD-10-CM

## 2021-11-18 PROCEDURE — 72148 MRI LUMBAR SPINE W/O DYE: CPT

## 2021-12-06 ENCOUNTER — HOSPITAL ENCOUNTER (OUTPATIENT)
Dept: ULTRASOUND IMAGING | Age: 51
Discharge: HOME | End: 2021-12-06
Payer: COMMERCIAL

## 2021-12-06 DIAGNOSIS — R10.13 EPIGASTRIC PAIN: ICD-10-CM

## 2021-12-06 DIAGNOSIS — R74.8 ELEVATED LIVER ENZYMES: ICD-10-CM

## 2021-12-06 PROCEDURE — 76705 ECHO EXAM OF ABDOMEN: CPT

## 2021-12-27 ENCOUNTER — HOSPITAL ENCOUNTER (OUTPATIENT)
Dept: CT IMAGING | Age: 51
Discharge: HOME | End: 2021-12-27
Payer: COMMERCIAL

## 2021-12-27 DIAGNOSIS — R10.11 RIGHT UPPER QUADRANT PAIN: ICD-10-CM

## 2021-12-27 PROCEDURE — 74160 CT ABDOMEN W/CONTRAST: CPT

## 2021-12-27 PROCEDURE — 6360000004 HC RX CONTRAST MEDICATION: Performed by: FAMILY MEDICINE

## 2021-12-27 RX ADMIN — IOPAMIDOL 100 ML: 755 INJECTION, SOLUTION INTRAVENOUS at 11:18

## 2021-12-27 RX ADMIN — IOHEXOL 50 ML: 240 INJECTION, SOLUTION INTRATHECAL; INTRAVASCULAR; INTRAVENOUS; ORAL at 11:18

## 2022-02-26 NOTE — DISCHARGE SUMMARY
Outpatient Physical Therapy           [place]           [] Phone: [phone]   Fax: [phone]  [facility]           [x] Phone: [phone]   Fax: [phone]      To: Referring Practitioner: Santi Doherty    From: Duyen Mcallister PT,    Patient: Enio Venegas                  : 1970  Diagnosis:  Diagnosis: lumbar strain      Date: 2022  Treatment Diagnosis: Treatment Diagnosis: LBP       []  Progress Note                [x]  Discharge Note    Evaluation Date:   21  Total Visits to date:9 authorized for10    Cancels/No-shows to date:      Subjective:  10/25/21 Patient reports 4/10 pain upon arrival and says he did nothing to aggravate his pain over the weekend. Plan of Care/Treatment to date:  [x] Therapeutic Exercise    [] Modalities:  [x] Therapeutic Activity     [] Ultrasound  [] Electrical Stimulation  [] Gait Training      [] Cervical Traction   [] Lumbar Traction  [x] Neuromuscular Re-education  [] Cold/hotpack [] Iontophoresis  [x] Instruction in HEP      Other:  [x] Manual Therapy       []  Vasopneumatic  [] Aquatic Therapy       []   Dry Needle Therapy                      Objective/Significant Findings At Last Visit/Comments:    Last 7 days highest 6-7/10  At best 3-4/10   Is pain free when relaxing but with movement he has some kind of pain. Pt was able to complete all ex.'s without difficulty        Goal Status:  [] Achieved [] Partially Achieved  [x] Not Achieved           Short term goals  Time Frame for Short term goals: 10  sessions  Short term goal 1: patient will report LBP no greater than 1/10 lbp  Short term goal 2: patient will be compliant with HEP                 [x] Patient now discharged- authorization       Electronically signed by:  Duyen Mcallister PT, 2022, 9:40 AM    If you have any questions or concerns, please don't hesitate to call.   Thank you for your referral.

## 2022-03-14 ENCOUNTER — APPOINTMENT (OUTPATIENT)
Dept: GENERAL RADIOLOGY | Age: 52
End: 2022-03-14
Payer: COMMERCIAL

## 2022-03-14 ENCOUNTER — HOSPITAL ENCOUNTER (EMERGENCY)
Age: 52
Discharge: HOME | End: 2022-03-14
Attending: EMERGENCY MEDICINE
Payer: COMMERCIAL

## 2022-03-14 VITALS
BODY MASS INDEX: 35.79 KG/M2 | SYSTOLIC BLOOD PRESSURE: 125 MMHG | HEIGHT: 70 IN | TEMPERATURE: 98 F | WEIGHT: 250 LBS | OXYGEN SATURATION: 95 % | RESPIRATION RATE: 20 BRPM | DIASTOLIC BLOOD PRESSURE: 85 MMHG | HEART RATE: 70 BPM

## 2022-03-14 DIAGNOSIS — I48.0 PAROXYSMAL ATRIAL FIBRILLATION (HCC): Primary | ICD-10-CM

## 2022-03-14 LAB
ALBUMIN SERPL-MCNC: 4 GM/DL (ref 3.4–5)
ALP BLD-CCNC: 108 IU/L (ref 40–129)
ALT SERPL-CCNC: <5 U/L (ref 10–40)
ANION GAP SERPL CALCULATED.3IONS-SCNC: 13 MMOL/L (ref 4–16)
AST SERPL-CCNC: 49 IU/L (ref 15–37)
BASOPHILS ABSOLUTE: 0.1 K/CU MM
BASOPHILS RELATIVE PERCENT: 0.8 % (ref 0–1)
BILIRUB SERPL-MCNC: 0.2 MG/DL (ref 0–1)
BUN BLDV-MCNC: 15 MG/DL (ref 6–23)
CALCIUM SERPL-MCNC: 8.9 MG/DL (ref 8.3–10.6)
CHLORIDE BLD-SCNC: 99 MMOL/L (ref 99–110)
CO2: 22 MMOL/L (ref 21–32)
CREAT SERPL-MCNC: 0.7 MG/DL (ref 0.9–1.3)
DIFFERENTIAL TYPE: ABNORMAL
EKG DIAGNOSIS: NORMAL
EKG Q-T INTERVAL: 342 MS
EKG QRS DURATION: 106 MS
EKG QTC CALCULATION (BAZETT): 483 MS
EKG R AXIS: 53 DEGREES
EKG T AXIS: 2 DEGREES
EKG VENTRICULAR RATE: 120 BPM
EOSINOPHILS ABSOLUTE: 0.1 K/CU MM
EOSINOPHILS RELATIVE PERCENT: 1.3 % (ref 0–3)
GFR AFRICAN AMERICAN: >60 ML/MIN/1.73M2
GFR NON-AFRICAN AMERICAN: >60 ML/MIN/1.73M2
GLUCOSE BLD-MCNC: 109 MG/DL (ref 70–99)
HCT VFR BLD CALC: 43.9 % (ref 42–52)
HEMOGLOBIN: 14.1 GM/DL (ref 13.5–18)
IMMATURE NEUTROPHIL %: 0.2 % (ref 0–0.43)
LYMPHOCYTES ABSOLUTE: 4 K/CU MM
LYMPHOCYTES RELATIVE PERCENT: 43.7 % (ref 24–44)
MAGNESIUM: 1.8 MG/DL (ref 1.8–2.4)
MCH RBC QN AUTO: 25.8 PG (ref 27–31)
MCHC RBC AUTO-ENTMCNC: 32.1 % (ref 32–36)
MCV RBC AUTO: 80.4 FL (ref 78–100)
MONOCYTES ABSOLUTE: 0.6 K/CU MM
MONOCYTES RELATIVE PERCENT: 6.8 % (ref 0–4)
PDW BLD-RTO: 15 % (ref 11.7–14.9)
PLATELET # BLD: 228 K/CU MM (ref 140–440)
PMV BLD AUTO: 10.6 FL (ref 7.5–11.1)
POTASSIUM SERPL-SCNC: 5.3 MMOL/L (ref 3.5–5.1)
PRO-BNP: 29.5 PG/ML
RBC # BLD: 5.46 M/CU MM (ref 4.6–6.2)
SEGMENTED NEUTROPHILS ABSOLUTE COUNT: 4.4 K/CU MM
SEGMENTED NEUTROPHILS RELATIVE PERCENT: 47.2 % (ref 36–66)
SODIUM BLD-SCNC: 134 MMOL/L (ref 135–145)
T4 FREE: 0.85 NG/DL (ref 0.9–1.8)
TOTAL IMMATURE NEUTOROPHIL: 0.02 K/CU MM
TOTAL PROTEIN: 7 GM/DL (ref 6.4–8.2)
TROPONIN T: <0.01 NG/ML
TSH HIGH SENSITIVITY: 3.06 UIU/ML (ref 0.27–4.2)
WBC # BLD: 9.2 K/CU MM (ref 4–10.5)

## 2022-03-14 PROCEDURE — 93005 ELECTROCARDIOGRAM TRACING: CPT | Performed by: EMERGENCY MEDICINE

## 2022-03-14 PROCEDURE — 6370000000 HC RX 637 (ALT 250 FOR IP): Performed by: EMERGENCY MEDICINE

## 2022-03-14 PROCEDURE — 83735 ASSAY OF MAGNESIUM: CPT

## 2022-03-14 PROCEDURE — 84439 ASSAY OF FREE THYROXINE: CPT

## 2022-03-14 PROCEDURE — 80053 COMPREHEN METABOLIC PANEL: CPT

## 2022-03-14 PROCEDURE — 84443 ASSAY THYROID STIM HORMONE: CPT

## 2022-03-14 PROCEDURE — 99283 EMERGENCY DEPT VISIT LOW MDM: CPT

## 2022-03-14 PROCEDURE — 83880 ASSAY OF NATRIURETIC PEPTIDE: CPT

## 2022-03-14 PROCEDURE — 93010 ELECTROCARDIOGRAM REPORT: CPT | Performed by: INTERNAL MEDICINE

## 2022-03-14 PROCEDURE — 85379 FIBRIN DEGRADATION QUANT: CPT

## 2022-03-14 PROCEDURE — 85025 COMPLETE CBC W/AUTO DIFF WBC: CPT

## 2022-03-14 PROCEDURE — 71045 X-RAY EXAM CHEST 1 VIEW: CPT

## 2022-03-14 PROCEDURE — 84484 ASSAY OF TROPONIN QUANT: CPT

## 2022-03-14 RX ORDER — GABAPENTIN 100 MG/1
CAPSULE ORAL
COMMUNITY
Start: 2022-01-14

## 2022-03-14 RX ORDER — ASPIRIN 81 MG/1
81 TABLET, CHEWABLE ORAL DAILY
Qty: 30 TABLET | Refills: 0 | Status: SHIPPED | OUTPATIENT
Start: 2022-03-14 | End: 2022-04-19 | Stop reason: SDUPTHER

## 2022-03-14 RX ORDER — SUCRALFATE 1 G/1
TABLET ORAL
COMMUNITY
Start: 2022-01-18

## 2022-03-14 RX ORDER — ATORVASTATIN CALCIUM 20 MG/1
20 TABLET, FILM COATED ORAL DAILY
COMMUNITY
Start: 2022-01-18 | End: 2022-04-19

## 2022-03-14 RX ORDER — 0.9 % SODIUM CHLORIDE 0.9 %
1000 INTRAVENOUS SOLUTION INTRAVENOUS ONCE
Status: DISCONTINUED | OUTPATIENT
Start: 2022-03-14 | End: 2022-03-14 | Stop reason: HOSPADM

## 2022-03-14 RX ORDER — OMEPRAZOLE 40 MG/1
CAPSULE, DELAYED RELEASE ORAL
COMMUNITY
Start: 2021-12-15

## 2022-03-14 RX ORDER — ASPIRIN 81 MG/1
324 TABLET, CHEWABLE ORAL ONCE
Status: COMPLETED | OUTPATIENT
Start: 2022-03-14 | End: 2022-03-14

## 2022-03-14 RX ORDER — DILTIAZEM HYDROCHLORIDE 5 MG/ML
10 INJECTION INTRAVENOUS ONCE
Status: DISCONTINUED | OUTPATIENT
Start: 2022-03-14 | End: 2022-03-14

## 2022-03-14 RX ADMIN — ASPIRIN 81 MG CHEWABLE TABLET 324 MG: 81 TABLET CHEWABLE at 03:38

## 2022-03-14 NOTE — ED PROVIDER NOTES
Triage Chief Complaint:   Chest Pain (Middle of chest, pressure, )    [place]:  Orlando Segal is a 46 y.o. male that presents with chest pain. Patient was in baseline state of health until midnight tonight when the above started. Patient reports sudden onset of chest \"pressure\" in his central chest is nonradiating. Some associated sensation of rapid heart beating and palpitations. Patient has never had symptoms like this before. Patient has a history of reflux but reports his pain is very different. No shortness of breath, nausea or sweating. Patient has otherwise been doing well. Tolerating normal diet. No new medications. No known personal history of any coronary disease although does run in the family. Patient does have a history of hypertension and hyperlipidemia for which he is on medications. Patient denies any prior heart testing including stress testing or heart cath. Patient is non-smoker. No illicit substances of abuse. No history of DVT or PE. No recent hospitalizations. No prolonged mobilization. Patient is triple vaccinated for COVID. ROS:  General:  No fevers, no chills, no weakness  Eyes:  No recent vison changes, no discharge  ENT:  No sore throat, no nasal congestion  Cardiovascular:  + chest pain/pressure, + palpitations  Respiratory:  No shortness of breath, no cough, no wheezing  Gastrointestinal:  No pain, no nausea, no vomiting, no diarrhea  Musculoskeletal:  No muscle pain, no joint pain  Skin:  No rash, no pruritis, no easy bruising  Neurologic:  No speech problems, no headache, no extremity numbness, no extremity tingling, no extremity weakness  Psychiatric:  No anxiety  Genitourinary:  No dysuria, no increased urinary frequency  Extremities:  no edema, no pain    Past Medical History:   Diagnosis Date    GERD (gastroesophageal reflux disease)     Hyperlipidemia     Hypertriglyceridemia      History reviewed. No pertinent surgical history. History reviewed.  No pertinent family history. Social History     Socioeconomic History    Marital status:      Spouse name: Not on file    Number of children: Not on file    Years of education: Not on file    Highest education level: Not on file   Occupational History    Not on file   Tobacco Use    Smoking status: Never Smoker    Smokeless tobacco: Never Used   Vaping Use    Vaping Use: Never used   Substance and Sexual Activity    Alcohol use: Never    Drug use: Never    Sexual activity: Not on file   Other Topics Concern    Not on file   Social History Narrative    Not on file     Social Determinants of Health     Financial Resource Strain:     Difficulty of Paying Living Expenses: Not on file   Food Insecurity:     Worried About [address] in the Last Year: Not on file    Zhang of Food in the Last Year: Not on file   Transportation Needs:     Lack of Transportation (Medical): Not on file    Lack of Transportation (Non-Medical):  Not on file   Physical Activity:     Days of Exercise per Week: Not on file    Minutes of Exercise per Session: Not on file   Stress:     Feeling of Stress : Not on file   Social Connections:     Frequency of Communication with Friends and Family: Not on file    Frequency of Social Gatherings with Friends and Family: Not on file    Attends Caoda[employer] Services: Not on file    Active Member of [address] [place] [address] [employer] or Organizations: Not on file    Attends Club or Organization Meetings: Not on file    Marital Status: Not on file   Intimate Partner Violence:     Fear of Current or Ex-Partner: Not on file    Emotionally Abused: Not on file    Physically Abused: Not on file    Sexually Abused: Not on file   Housing Stability:     Unable to Pay for Housing in the Last Year: Not on file    Number of [place] in the Last Year: Not on file    Unstable Housing in the Last Year: Not on file     Current Facility-Administered Medications   Medication Dose Route Frequency Provider Last Rate Last Admin    0.9 % sodium chloride bolus  1,000 mL IntraVENous Once Alberto Cardenas MD         Current Outpatient Medications   Medication Sig Dispense Refill    omeprazole (PRILOSEC) 40 MG delayed release capsule       atorvastatin (LIPITOR) 20 MG tablet Take 20 mg by mouth daily      sucralfate (CARAFATE) 1 GM tablet Take 1 tablet before each meal and then 1 tablet before bed for a total of 4 times daily.  gabapentin (NEURONTIN) 100 MG capsule       aspirin (ASPIRIN CHILDRENS) 81 MG chewable tablet Take 1 tablet by mouth daily 30 tablet 0     No Known Allergies    Nursing Notes Reviewed    Physical Exam:  ED Triage Vitals   Enc Vitals Group      BP       Pulse       Resp       Temp       Temp src       SpO2       Weight       Height       Head Circumference       Peak Flow       Pain Score       Pain Loc       Pain Edu? Excl. in [address]? My pulse ox interpretation is - normal    General appearance:  No acute distress. Sitting upright in bed. Appears well. Pleasant. Skin:  Warm. Dry. No diaphoresis. Eye:  Extraocular movements intact. Ears, nose, mouth and throat:  Oral mucosa moist   Neck:  Trachea midline. Extremity:  Normal ROM. No bilateral lower extremity edema. No calf tenderness to palpation. Heart: Tachycardic and irregular, normal S1 & S2, no extra heart sounds. Perfusion:  Intact  Respiratory:  Lungs clear to auscultation bilaterally. Respirations nonlabored. Speaking clearly in full sentences. No respiratory distress. Speaking clearly in full sentences. Abdominal:  Normal bowel sounds. Soft. Nontender. Non distended. Back:  No CVA tenderness to palpation     Neurological:  Alert and oriented times 3. No focal neuro deficits.              Psychiatric:  Appropriate    I have reviewed and interpreted all of the currently available lab results from this visit (if applicable):  Results for orders placed or performed during the hospital encounter of 03/14/22   CBC with Auto Differential   Result Value Ref Range    WBC 9.2 4.0 - 10.5 K/CU MM    RBC 5.46 4.6 - 6.2 M/CU MM    Hemoglobin 14.1 13.5 - 18.0 GM/DL    Hematocrit 43.9 42 - 52 %    MCV 80.4 78 - 100 FL    MCH 25.8 (L) 27 - 31 PG    MCHC 32.1 32.0 - 36.0 %    RDW 15.0 (H) 11.7 - 14.9 %    Platelets 533 554 - 744 K/CU MM    MPV 10.6 7.5 - 11.1 FL    Differential Type AUTOMATED DIFFERENTIAL     Segs Relative 47.2 36 - 66 %    Lymphocytes % 43.7 24 - 44 %    Monocytes % 6.8 (H) 0 - 4 %    Eosinophils % 1.3 0 - 3 %    Basophils % 0.8 0 - 1 %    Segs Absolute 4.4 K/CU MM    Lymphocytes Absolute 4.0 K/CU MM    Monocytes Absolute 0.6 K/CU MM    Eosinophils Absolute 0.1 K/CU MM    Basophils Absolute 0.1 K/CU MM    Immature Neutrophil % 0.2 0 - 0.43 %    Total Immature Neutrophil 0.02 K/CU MM   Comprehensive Metabolic Panel w/ Reflex to MG   Result Value Ref Range    Sodium 134 (L) 135 - 145 MMOL/L    Potassium 5.3 (H) 3.5 - 5.1 MMOL/L    Chloride 99 99 - 110 mMol/L    CO2 22 21 - 32 MMOL/L    BUN 15 6 - 23 MG/DL    CREATININE 0.7 (L) 0.9 - 1.3 MG/DL    Glucose 109 (H) 70 - 99 MG/DL    Calcium 8.9 8.3 - 10.6 MG/DL    Albumin 4.0 3.4 - 5.0 GM/DL    Total Protein 7.0 6.4 - 8.2 GM/DL    Total Bilirubin 0.2 0.0 - 1.0 MG/DL    Alkaline Phosphatase 108 40 - 129 IU/L    GFR Non-African American >60 >60 mL/min/1.73m2    GFR African American >60 >60 mL/min/1.73m2    Anion Gap 13 4 - 16   Magnesium   Result Value Ref Range    Magnesium 1.8 1.8 - 2.4 mg/dl   Brain Natriuretic Peptide   Result Value Ref Range    Pro-BNP 29.50 <300 PG/ML   Troponin   Result Value Ref Range    Troponin T <0.010 <0.01 NG/ML   TSH   Result Value Ref Range    TSH, High Sensitivity 3.060 0.270 - 4.20 uIu/ml   EKG 12 Lead   Result Value Ref Range    Ventricular Rate 120 BPM    Atrial Rate 136 BPM    QRS Duration 106 ms    Q-T Interval 342 ms    QTc Calculation (Bazett) 483 ms    R Axis 53 degrees    T Axis 2 degrees    Diagnosis       Atrial fibrillation with rapid ventricular response  Nonspecific ST abnormality  Abnormal ECG  No previous ECGs available        Radiographs (if obtained):  [] The following radiograph was interpreted by myself in the absence of a radiologist:   [x] Radiologist's Report Reviewed:  XR CHEST PORTABLE   Final Result   Cardiac silhouette is at the upper limits of normal but does have mild   pulmonary vascular congestion. No focal consolidation, pleural effusion, or   pneumothorax. EKG (if obtained): (All EKG's are interpreted by myself in the absence of a cardiologist)  12 lead EKG per my interpretation:  Atrial Fibrillation with rapid ventricular response at 120  Axis is   Normal  QTc is  within an acceptable range  There is no specific T wave changes appreciated. There is no specific ST wave changes appreciated. No STEMI    Prior EKG to compare with was not available. 12 lead EKG per my interpretation #2:  Normal Sinus Rhythm at 75  Axis is   Normal  QTc is  within an acceptable range  There is no specific T wave changes appreciated. There is no specific ST wave changes appreciated. No STEMI  No S1Q3T3    Prior EKG to compare with was available and atrial fibrillation with RVR is replaced with normal sinus rhythm as above. Chart review shows recent radiographs:  No results found. MDM:  Pt presents as above. Emergent conditions considered. Presentation prompted initial labs, EKG and imaging as above. EKG with atrial fibrillation with rapid ventricular response as above. IVs established and IV Cardizem bolus and infusion are initiated. Full dose aspirin given. IV fluid bolus is also initiated given the tachycardia. Prior to med administration; while IV is being attempted patient does convert to normal sinus rhythm with the needlestick. EKG is repeated and confirms this as above. Cardizem bolus and infusion are thus held.     Patient's chest x-ray is read by radiology as borderline cardiomegaly as well as some mild pulmonary vascular congestion. There is no evidence of any pleural effusion, consolidation or pneumothorax making these diagnoses much less likely. The patient's initial troponin is within the normal range. Patient's EKG did not show any acute diagnostic ischemic changes as above. CBC and CMP without clinically significant derangement other than mild hyperkalemia. BNP is not suggestive of volume overload. TSH is within normal limits. Patient is low risk [place]; doubt pulmonary embolism. Patient on recheck continues to remain asymptomatic and remains in normal sinus rhythm. Patient on further conversation does report increased snoring and daytime sleepiness and he might have underlying MOLINA leading to his atrial fibrillation. The need for further testing for this including sleep study are discussed with patient based on CHADS-VASc score of 1 and only paroxysmal AFIB present at this time I do not believe the risks of AC at this time outweigh the benefits. Stroke risk was 0.6% per year in >90,000 patients (the [place] Atrial Fibrillation Cohort Study) and 0.9% risk of stroke/TIA/systemic embolism. Patient is counseled regarding this. I have considered the diagnosis of pulmonary embolism and aortic dissection, but based on the patient's history, clinical exam, and studies, the likelihood for these diagnosis is below the threshold for further testing in the emergency department. I have given the patient instructions to followup with their doctor in the next 2-3 days. They have been asked to return to the ED should they develop worsening symptoms. Additionally, patient encouraged to contact our cardiology team to arrange for close outpatient testing including stress testing and potential heart monitor to assess for A. fib burden. Patient and son are agreeable with this plan and plan on calling this morning to arrange for this.     I discussed specific signs and symptoms and when to return to the emergency department as well as the need for close outpatient follow-up. Questions sought and answered with the patient. They voice understanding and agree with plan. Clinical Impression:  1. Paroxysmal atrial fibrillation (HCC)      Disposition referral (if applicable):  Catalina Yusuf MD  [address] [address]  [phone]    Schedule an appointment as soon as possible for a visit   CALL THIS MORNING TO [address] [place] [address] Emergency Department  [address]  [phone]  Today  If symptoms worsen    Disposition medications (if applicable):  New Prescriptions    ASPIRIN (ASPIRIN CHILDRENS) 81 MG CHEWABLE TABLET    Take 1 tablet by mouth daily       Comment: Please note this report has been produced using speech recognition software and may contain errors related to that system including errors in grammar, punctuation, and spelling, as well as words and phrases that may be inappropriate. If there are any questions or concerns please feel free to contact the dictating provider for clarification.        Louisa Woods MD  03/14/22 2060

## 2022-03-14 NOTE — ED NOTES
While triaging patient, it was noted that pt and son was having a conversation regarding the pts loud snoring, this nurse questioned the patient on his sleeping habits, Pt admits to feeling tired all the time, falling asleep frequently throughout the day even when driving, he states that he does snore loudly. Denies ever being diagnosed with MOLINA, was advised to request a sleep study test from his PCP.         Yi Hair, CHANDU  03/14/22 8382

## 2022-03-14 NOTE — ED NOTES
Patient presents to ED with complaint of mid sternal chest pain, pressure, patient placed on monitor and found to be in A-Fib, when asked patient confirms that he has a feeling of palpitations.         Maria G Katz RN  03/14/22 4446

## 2022-03-22 ENCOUNTER — OFFICE VISIT (OUTPATIENT)
Dept: CARDIOLOGY CLINIC | Age: 52
End: 2022-03-22
Payer: COMMERCIAL

## 2022-03-22 ENCOUNTER — HOSPITAL ENCOUNTER (OUTPATIENT)
Age: 52
Discharge: HOME | End: 2022-03-22
Payer: COMMERCIAL

## 2022-03-22 VITALS
BODY MASS INDEX: 36.51 KG/M2 | HEART RATE: 66 BPM | DIASTOLIC BLOOD PRESSURE: 66 MMHG | SYSTOLIC BLOOD PRESSURE: 126 MMHG | WEIGHT: 255 LBS | HEIGHT: 70 IN

## 2022-03-22 DIAGNOSIS — I48.0 PAF (PAROXYSMAL ATRIAL FIBRILLATION) (HCC): Primary | ICD-10-CM

## 2022-03-22 DIAGNOSIS — R07.89 ATYPICAL CHEST PAIN: ICD-10-CM

## 2022-03-22 DIAGNOSIS — E78.2 MIXED HYPERLIPIDEMIA: ICD-10-CM

## 2022-03-22 DIAGNOSIS — R00.2 PALPITATIONS: ICD-10-CM

## 2022-03-22 PROBLEM — E78.5 HYPERLIPIDEMIA: Status: ACTIVE | Noted: 2022-03-22

## 2022-03-22 LAB
CHOLESTEROL, FASTING: 321 MG/DL
HDLC SERPL-MCNC: 37 MG/DL
LDL CHOLESTEROL CALCULATED: ABNORMAL MG/DL
LDL CHOLESTEROL DIRECT: 140 MG/DL
TRIGLYCERIDE, FASTING: 428 MG/DL

## 2022-03-22 PROCEDURE — 99204 OFFICE O/P NEW MOD 45 MIN: CPT | Performed by: INTERNAL MEDICINE

## 2022-03-22 PROCEDURE — 36415 COLL VENOUS BLD VENIPUNCTURE: CPT

## 2022-03-22 PROCEDURE — 83721 ASSAY OF BLOOD LIPOPROTEIN: CPT

## 2022-03-22 PROCEDURE — 80061 LIPID PANEL: CPT

## 2022-03-22 RX ORDER — IBUPROFEN 800 MG/1
TABLET ORAL
COMMUNITY
Start: 2022-01-14 | End: 2022-10-11 | Stop reason: ALTCHOICE

## 2022-03-22 RX ORDER — METOPROLOL SUCCINATE 25 MG/1
25 TABLET, EXTENDED RELEASE ORAL DAILY
Qty: 30 TABLET | Refills: 3 | Status: SHIPPED | OUTPATIENT
Start: 2022-03-22 | End: 2022-04-18

## 2022-03-22 NOTE — ASSESSMENT & PLAN NOTE
Start Toprol 25 mg daily and continue low-dose aspirin.   [id number] Score for Atrial Fibrillation Stroke Risk is 0

## 2022-03-22 NOTE — PROGRESS NOTES
CARDIOLOGY CONSULTATION    Stephanie Matt  1970    Dear Dr. Wilma Guaman PA    Thank you for asking me to participate in care of Stephanie Matt    Chief Complaint   Patient presents with   [place] Chest Pain     States chest pain, palpitations started on 3/14 was seen at [facility]. Next episode last night 3/21 lasted for 40 min. Denies SOB, Dizzines and Edema.  Palpitations     History of present illness:  Stephanie Matt is a 46 y.o. male is seeing me for the first time for evaluation of palpitations and chest pain. He went to the hospital and 14 with palpitations and chest pains and he was noted to be in rapid atrial fibrillation at a rate of 120 bpm.  He spontaneously converted and he was sent home on low-dose aspirin. He has been having palpitations for the last 6 months off-and-on but did not happen every day. He had one episode last night lasted for 40 minutes. Denies any dizziness syncope or near syncope. Patient speaks little [place] and he is accompanied by his son who helps as with communication. He understands most of the things discussed today. He has never been a smoker. He works for [employer] and had some injury and waiting for [employer] to kick in and he has not been paid and it is somewhat stressful. He also has history suggestive of obstructive sleep apnea and he is scheduled to have sleep study done. Patient does not smoke or drink alcohol. REVIEW OF SYSTEMS:   Constitutional:  Denies generalized weakness  Eyes:  Denies change in visual acuity  HENT:  Denies nasal congestion or sore throat  Respiratory:  Denies cough or shortness of breath  Cardiovascular: as in HPI  GI:  Denies abdominal pain, complains of nausea and vomiting  :  Denies dysuria  Musculoskeletal:  Denies back pain or joint pain  Integument:  Denies rash  Neurologic:  Denies headache, focal weakness or sensory changes  \"Remaining review of systems reviewed and negative.      Past medical history:  has a past medical history of Atypical chest pain, GERD (gastroesophageal reflux disease), Hyperlipidemia, Hypertriglyceridemia, and Palpitations. Past surgical history:  has no past surgical history on file. Social History:   Social History     Tobacco Use    Smoking status: Never Smoker    Smokeless tobacco: Never Used   Substance Use Topics    Alcohol use: Never     Family history: family history is not on file. No Known Allergies  Prior to Admission medications    Medication Sig Start Date End Date Taking? Authorizing Provider   ibuprofen (ADVIL;MOTRIN) 800 MG tablet  1/14/22  Yes Historical Provider, MD   metoprolol succinate (TOPROL XL) 25 MG extended release tablet Take 1 tablet by mouth daily 3/22/22  Yes Randall Woods MD   omeprazole (PRILOSEC) 40 MG delayed release capsule  12/15/21   Historical Provider, MD   atorvastatin (LIPITOR) 20 MG tablet Take 20 mg by mouth daily 1/18/22   Historical Provider, MD   sucralfate (CARAFATE) 1 GM tablet Take 1 tablet before each meal and then 1 tablet before bed for a total of 4 times daily. 1/18/22   Historical Provider, MD   gabapentin (NEURONTIN) 100 MG capsule  1/14/22   Historical Provider, MD   aspirin (ASPIRIN CHILDRENS) 81 MG chewable tablet Take 1 tablet by mouth daily 3/14/22   Irwin Dexter MD     Physical Examination:    Vitals:    03/22/22 1442   BP: 126/66   Pulse: 66   Weight: 255 lb (115.7 kg)   Height: 5' 10\" (1.778 m)      Body mass index is 36.59 kg/m². Wt Readings from Last 3 Encounters:   03/22/22 255 lb (115.7 kg)   03/14/22 250 lb (113.4 kg)   05/19/21 246 lb (111.6 kg)     Constitutional: Moderately obese male in no apparent distress  Eyes: Pupils are equal no conjunctival pallor noted  ENT: He has facemask and hearing aids  NECK: No JVP or thyromegaly  Cardiovascular: Auscultation: Normal S1 and S2. No murmurs or gallops noted. Carotids are negative for bruits. Abdominal aorta is nonpalpable. No epigastric bruit noted.  Peripheral low sodium and low fat diet. Also counseled for increase in fresh fruits and vegetables and healthy lifestyles. Repeat lipids. Counseled for calorie counting, reduction in serving size and exercise and lifestyle modification for weight loss. Multiple questions answered. Patient verbalizes understanding and asks relevant questions. Follow up in 4 weeks, , sooner if needed. Giuliana Aguilar MD, 3/22/2022 3:25 PM     Please note this report has been produced using speech recognition software and may contain errors related to that system including errors in grammar, punctuation, and spelling, as well as words and phrases that may be inappropriate.  If there are any questions or concerns please feel free to contact the dictating provider for clarification

## 2022-03-22 NOTE — ASSESSMENT & PLAN NOTE
Probably related angina. We will do a regular stress test evaluated further. Start Toprol 25 mg a day and continue low-dose aspirin.

## 2022-03-22 NOTE — PATIENT INSTRUCTIONS
Toprol 25 mg daily. Follow up with sleep study and echo and treadmill exercise test. Patient is counseled for low cholesterol, low sodium and low fat diet. Also counseled for increase in fresh fruits and vegetables and healthy lifestyles. Repeat lipids. Counseled for calorie counting, reduction in serving size and exercise and lifestyle modification for weight loss. Multiple questions answered. Patient verbalizes understanding and asks relevant questions. Follow up in 4 weeks, , sooner if needed.

## 2022-03-29 ENCOUNTER — HOSPITAL ENCOUNTER (OUTPATIENT)
Dept: GENERAL RADIOLOGY | Age: 52
Discharge: HOME | End: 2022-03-29
Payer: COMMERCIAL

## 2022-03-29 DIAGNOSIS — K76.5 HEPATIC VENO-OCCLUSIVE DISEASE: ICD-10-CM

## 2022-03-29 DIAGNOSIS — K21.9 GASTROESOPHAGEAL REFLUX DISEASE, UNSPECIFIED WHETHER ESOPHAGITIS PRESENT: ICD-10-CM

## 2022-03-29 DIAGNOSIS — R10.10 UPPER ABDOMINAL PAIN: ICD-10-CM

## 2022-03-29 PROCEDURE — 74248 X-RAY SM INT F-THRU STD: CPT

## 2022-03-30 ENCOUNTER — PROCEDURE VISIT (OUTPATIENT)
Dept: CARDIOLOGY CLINIC | Age: 52
End: 2022-03-30
Payer: COMMERCIAL

## 2022-03-30 DIAGNOSIS — R07.89 ATYPICAL CHEST PAIN: ICD-10-CM

## 2022-03-30 DIAGNOSIS — E78.2 MIXED HYPERLIPIDEMIA: ICD-10-CM

## 2022-03-30 DIAGNOSIS — R00.2 PALPITATIONS: ICD-10-CM

## 2022-03-30 DIAGNOSIS — I48.0 PAF (PAROXYSMAL ATRIAL FIBRILLATION) (HCC): Primary | ICD-10-CM

## 2022-03-30 LAB
LV EF: 58 %
LVEF MODALITY: NORMAL

## 2022-03-30 PROCEDURE — 93306 TTE W/DOPPLER COMPLETE: CPT | Performed by: INTERNAL MEDICINE

## 2022-03-30 PROCEDURE — 93015 CV STRESS TEST SUPVJ I&R: CPT | Performed by: INTERNAL MEDICINE

## 2022-03-31 ENCOUNTER — TELEPHONE (OUTPATIENT)
Dept: CARDIOLOGY CLINIC | Age: 52
End: 2022-03-31

## 2022-04-18 RX ORDER — METOPROLOL SUCCINATE 25 MG/1
TABLET, EXTENDED RELEASE ORAL
Qty: 30 TABLET | Refills: 3 | Status: SHIPPED | OUTPATIENT
Start: 2022-04-18 | End: 2022-09-14

## 2022-04-19 ENCOUNTER — NURSE ONLY (OUTPATIENT)
Dept: CARDIOLOGY CLINIC | Age: 52
End: 2022-04-19
Payer: COMMERCIAL

## 2022-04-19 ENCOUNTER — OFFICE VISIT (OUTPATIENT)
Dept: CARDIOLOGY CLINIC | Age: 52
End: 2022-04-19
Payer: COMMERCIAL

## 2022-04-19 VITALS
HEART RATE: 80 BPM | RESPIRATION RATE: 16 BRPM | SYSTOLIC BLOOD PRESSURE: 108 MMHG | HEIGHT: 70 IN | BODY MASS INDEX: 36.79 KG/M2 | WEIGHT: 257 LBS | DIASTOLIC BLOOD PRESSURE: 74 MMHG

## 2022-04-19 DIAGNOSIS — E78.2 MIXED HYPERLIPIDEMIA: ICD-10-CM

## 2022-04-19 DIAGNOSIS — R00.2 PALPITATIONS: ICD-10-CM

## 2022-04-19 DIAGNOSIS — I48.0 PAF (PAROXYSMAL ATRIAL FIBRILLATION) (HCC): Primary | ICD-10-CM

## 2022-04-19 DIAGNOSIS — E66.01 CLASS 2 SEVERE OBESITY DUE TO EXCESS CALORIES WITH SERIOUS COMORBIDITY AND BODY MASS INDEX (BMI) OF 36.0 TO 36.9 IN ADULT (HCC): ICD-10-CM

## 2022-04-19 PROBLEM — E66.812 CLASS 2 OBESITY IN ADULT: Status: ACTIVE | Noted: 2022-04-19

## 2022-04-19 PROBLEM — E66.9 CLASS 2 OBESITY IN ADULT: Status: ACTIVE | Noted: 2022-04-19

## 2022-04-19 PROCEDURE — 93225 XTRNL ECG REC<48 HRS REC: CPT | Performed by: INTERNAL MEDICINE

## 2022-04-19 PROCEDURE — 99214 OFFICE O/P EST MOD 30 MIN: CPT | Performed by: INTERNAL MEDICINE

## 2022-04-19 RX ORDER — ASPIRIN 81 MG/1
81 TABLET, CHEWABLE ORAL DAILY
Qty: 100 TABLET | Refills: 2 | Status: SHIPPED | OUTPATIENT
Start: 2022-04-19

## 2022-04-19 RX ORDER — ATORVASTATIN CALCIUM 40 MG/1
40 TABLET, FILM COATED ORAL DAILY
Qty: 90 TABLET | Refills: 3 | Status: SHIPPED | OUTPATIENT
Start: 2022-04-19 | End: 2022-10-10

## 2022-04-19 NOTE — PROGRESS NOTES
Deepali Rodriguez (:  1970) is a 46 y.o. male,     Chief Complaint   Patient presents with    Atrial Fibrillation     Pt states he has fast heart beat when he tries to sleep. Pt states his Atorvastatin was increased from 20 mg to 40 mg today by PCP. Pt is non-smoker.  Hyperlipidemia     Patient is here for follow up for atypical chest pains and hyperlipidemia and history of paroxysmal atrial fibrillation and palpitations. He speaks little [place] and he called his wife to help him with communication. His palpitations have improved since started on Toprol 25 mg daily. He does not exercise. He does not smoke or do any drugs. We discussed the results of echocardiogram and treadmill stress test in detail and answered multiple questions. They both verbalized understanding. Patient is due to have sleep study done next week. No Known Allergies  Prior to Admission medications    Medication Sig Start Date End Date Taking? Authorizing Provider   atorvastatin (LIPITOR) 40 MG tablet Take 1 tablet by mouth daily 22  Yes Kim Meek MD   aspirin (ASPIRIN CHILDRENS) 81 MG chewable tablet Take 1 tablet by mouth daily 22  Yes Kim Meek MD   metoprolol succinate (TOPROL XL) 25 MG extended release tablet TAKE 1 TABLET BY MOUTH EVERY DAY 22  Yes Kim Meek MD   ibuprofen (ADVIL;MOTRIN) 800 MG tablet  22  Yes Historical Provider, MD   omeprazole (PRILOSEC) 40 MG delayed release capsule  12/15/21  Yes Historical Provider, MD   sucralfate (CARAFATE) 1 GM tablet Take 1 tablet before each meal and then 1 tablet before bed for a total of 4 times daily. 22  Yes Historical Provider, MD   gabapentin (NEURONTIN) 100 MG capsule  22  Yes Historical Provider, MD     Past Medical History:   Diagnosis Date    Atypical chest pain 2022    GERD (gastroesophageal reflux disease)     H/O Doppler echocardiogram 2022    EF 55-60. LA mild to moderately dilated.  Sclerotic but mostly at night. Orders:  -     Holter Monitor 48 Hour; Future  2. Mixed hyperlipidemia  Assessment & Plan:  Increase Lipitor to 40 mg daily. LDL goal is less than 100. Orders:  -     Lipid Panel; Standing  -     Hepatic Function Panel; Future  3. Palpitations  Assessment & Plan:  Improved since last visit. Continue Toprol 25 mg daily. Orders:  -     Holter Monitor 48 Hour; Future  4. Class 2 severe obesity due to excess calories with serious comorbidity and body mass index (BMI) of 36.0 to 36.9 in adult [facility])  Assessment & Plan:  Counseled for calorie counting, reduction in serving size and exercise and lifestyle modification for weight loss. Increase Lipitor 40 mg daily. Repeat lipids and LFT in 6 weeks. Counseled for calorie counting, reduction in serving size and exercise and lifestyle modification for weight loss. Primary/secondary prevention is the goal by aggressive risk modification, healthy and therapeutic life style changes for cardiovascular risk reduction. Various goals are discussed and questions answered. Follow-up in 3 months with repeat labs, sooner if needed. On this date 4/19/2022 I have spent 25 minutes reviewing previous notes, test results and face to face with the patient discussing the diagnosis and importance of compliance with the treatment plan as well as documenting on the day of the visit. An electronic signature was used to authenticate this note.     --Ella Vickers MD

## 2022-04-19 NOTE — ASSESSMENT & PLAN NOTE
Continue low-dose aspirin and Toprol 25 mg daily. Obtain 48-hour Holter monitor to see if he is having paroxysmal atrial fibrillation and also to assess palpitations which are mostly at night.

## 2022-04-19 NOTE — PROGRESS NOTES
Pt had 48 hour Holter Monitor applied for Atrial Fibrillation and palpitations. Pt instructed to wear continuously, do not get wet and keep diary. Pt and wife voiced understanding.

## 2022-04-19 NOTE — ASSESSMENT & PLAN NOTE
Counseled for calorie counting, reduction in serving size and exercise and lifestyle modification for weight loss.

## 2022-04-19 NOTE — PROGRESS NOTES
[id number] Score for Atrial Fibrillation Stroke Risk   Risk   Factors  Component Value   C CHF No 0   H HTN No 0   A2 Age >= 76 No,  (46 y.o.) 0   D DM No 0   S2 Prior Stroke/TIA No 0   V Vascular Disease No 0   A Age 74-69 No,  (46 y.o.) 0   Sc Sex male 0    [id number]  Score  0   Score last updated 4/19/22 0:58 PM EDT    Click here for a link to the UpToDate guideline \"Atrial Fibrillation: Anticoagulation therapy to prevent embolization    Disclaimer: Risk Score calculation is dependent on accuracy of patient problem list and past encounter diagnosis.

## 2022-04-19 NOTE — PATIENT INSTRUCTIONS
Increase Lipitor 40 mg daily. Repeat lipids and LFT in 6 weeks. Counseled for calorie counting, reduction in serving size and exercise and lifestyle modification for weight loss. Primary/secondary prevention is the goal by aggressive risk modification, healthy and therapeutic life style changes for cardiovascular risk reduction. Various goals are discussed and questions answered. Follow-up in 3 months with repeat labs, sooner if needed.

## 2022-04-20 ENCOUNTER — HOSPITAL ENCOUNTER (OUTPATIENT)
Dept: SLEEP CENTER | Age: 52
Discharge: HOME | End: 2022-04-20
Payer: COMMERCIAL

## 2022-04-20 VITALS — WEIGHT: 275 LBS | HEIGHT: 70 IN | BODY MASS INDEX: 39.37 KG/M2

## 2022-04-20 DIAGNOSIS — G47.10 HYPERSOMNIA: ICD-10-CM

## 2022-04-20 DIAGNOSIS — G47.33 OSA (OBSTRUCTIVE SLEEP APNEA): ICD-10-CM

## 2022-04-20 DIAGNOSIS — E66.9 OBESITY (BMI 30-39.9): ICD-10-CM

## 2022-04-20 PROCEDURE — 99204 OFFICE O/P NEW MOD 45 MIN: CPT | Performed by: INTERNAL MEDICINE

## 2022-04-20 PROCEDURE — 99211 OFF/OP EST MAY X REQ PHY/QHP: CPT

## 2022-04-20 NOTE — CONSULTS
Yaima Alcantara MD, Edwin Carr MD, Immanuel Pearl MD, [facility]      [address], [address] [facility]   [place] [address]: [phone]  F: [phone]     Subjective:     Patient ID: Katia Puga is a 46 y.o. male, referred to the sleep center for   Chief Complaint   Patient presents with    Daytime Sleepiness     R40.0    Snoring     R06.83    Other     R09.89 - Other specified symptoms and signs involving the circulatory and respiratory systems   .     Referring physician:  Kai Espinosa MD    History:has been referred for the MOLINA    Symptoms:   [x]  Snoring                                                                    [x]  Dry Mouth  [x]  Choking                                                                   [x]  Morning Headaches  [x]  Gasping for Air                                                        []  Trouble Falling asleep  [x]  Tired during the daytime                                         []  Trouble Staying Asleep  [x]  Tired when you wake up                                         [x]  Weight Gain in Last 5 Years  [x]  Wake up frequently at night                                    []  Weight Loss in Last 5 Years  []  Shortness Of Breath                                               []  Shift Worker  []  Coughing                                                                []  Smoker (Previous or Current)  []  Chest Pain                                                              []  Anxiety  []  Trouble keeping your legs still at night                   []  Depression  []  Kicking your legs in your sleep                               []  Insomnia       [x]  Stop breathing  [x]  Palpitations       [] Acting out the Dream  []  Other:       Significant Co-morbidities:  []  Congestive Heart Failure     []  COPD         []  Stroke (Past 30 Days)      []  Supplemental Oxygen Usage       []  Cognitive Impairment      []  Neuromuscular Problems  []  Epilepsy/Neurological Disorders      []CAD  [x]Afib        Social History     Socioeconomic History    Marital status:      Spouse name: Not on file    Number of children: Not on file    Years of education: Not on file    Highest education level: Not on file   Occupational History    Not on file   Tobacco Use    Smoking status: Never Smoker    Smokeless tobacco: Never Used   Vaping Use    Vaping Use: Never used   Substance and Sexual Activity    Alcohol use: Never    Drug use: Never    Sexual activity: Not on file   Other Topics Concern    Not on file   Social History Narrative    Not on file     Social Determinants of Health     Financial Resource Strain:     Difficulty of Paying Living Expenses: Not on file   Food Insecurity:     Worried About [address] in the Last Year: Not on file    Zhang of Food in the Last Year: Not on file   Transportation Needs:     Lack of Transportation (Medical): Not on file    Lack of Transportation (Non-Medical):  Not on file   Physical Activity:     Days of Exercise per Week: Not on file    Minutes of Exercise per Session: Not on file   Stress:     Feeling of Stress : Not on file   Social Connections:     Frequency of Communication with Friends and Family: Not on file    Frequency of Social Gatherings with Friends and Family: Not on file    Attends Sabianist Services: Not on file    Active Member of [address] [place] [address] [email or web address] or Organizations: Not on file    Attends Club or Organization Meetings: Not on file    Marital Status: Not on file   Intimate Partner Violence:     Fear of Current or Ex-Partner: Not on file    Emotionally Abused: Not on file    Physically Abused: Not on file    Sexually Abused: Not on file   Housing Stability:     Unable to Pay for Housing in the Last Year: Not on file    Number of [place] in the Last Year: Not on file    Unstable Housing in the Last Year: Not on file       Prior to Admission medications    Medication Sig Start Date End Date Taking? Authorizing Provider   atorvastatin (LIPITOR) 40 MG tablet Take 1 tablet by mouth daily 4/19/22  Yes Keiry Cervantes MD   aspirin (ASPIRIN CHILDRENS) 81 MG chewable tablet Take 1 tablet by mouth daily 4/19/22  Yes Keiry Cervantes MD   metoprolol succinate (TOPROL XL) 25 MG extended release tablet TAKE 1 TABLET BY MOUTH EVERY DAY 4/18/22  Yes Keiry Cervantes MD   ibuprofen (ADVIL;MOTRIN) 800 MG tablet  1/14/22  Yes Historical Provider, MD   omeprazole (PRILOSEC) 40 MG delayed release capsule  12/15/21  Yes Historical Provider, MD   sucralfate (CARAFATE) 1 GM tablet Take 1 tablet before each meal and then 1 tablet before bed for a total of 4 times daily. 1/18/22  Yes Historical Provider, MD   gabapentin (NEURONTIN) 100 MG capsule  1/14/22  Yes Historical Provider, MD       Allergies as of 04/20/2022    (No Known Allergies)       Patient Active Problem List   Diagnosis    Atypical chest pain    Hyperlipidemia    [facility]oxysmal [facility] fibrillation) ([facility])    Palpitations    Class 2 obesity in adult    MOLINA (obstructive sleep apnea)    Obesity (BMI 30-39. 9)    Hypersomnia       Past Medical History:   Diagnosis Date    Atypical chest pain 03/22/2022    GERD (gastroesophageal reflux disease)     H/O Doppler echocardiogram 03/30/2022    EF 55-60. LA mild to moderately dilated. Sclerotic but non-stenotic aortic valve. RVSP 19.    History of exercise stress test 03/30/2022    Normal near maximal study negative for angina or ECG evidence of ischemia.  Hyperlipidemia     Hypertriglyceridemia     Palpitations 03/22/2022       History reviewed. No pertinent surgical history. History reviewed. No pertinent family history. Objective:   Ht 5' 10\" (1.778 m)   Wt 275 lb (124.7 kg)   BMI 39.46 kg/m²   Body mass index is 39.46 kg/m².   Sleep Medicine 4/20/2022   Neck circumference (Inches) 18       Vitals:    04/20/22 1509   Weight: 275 lb (124.7 kg)   Height: 5' 10\" (1.778 m)     Neck circumference (Inches): 18  Inches  [place] -      Gen: No distress. Eyes: PERRL. No sclera icterus. No conjunctival injection. ENT: No discharge. Pharynx clear. External appearance of ears and nose normal.  Neck: Trachea midline. No obvious mass. Resp: No accessory muscle use. No crackles. No wheezes. No rhonchi. No dullness on percussion. CV: Regular rate. Regular rhythm. No murmur or rub. No edema. GI: Non-tender. Non-distended. No hernia. Skin: Warm, dry, normal texture and turgor. No nodule on exposed extremities. Lymph: No cervical LAD. No supraclavicular LAD. M/S: No cyanosis. No clubbing. No joint deformity. Psych: Oriented x 3. No anxiety. Awake. Alert. Intact judgement and insight. Mallampati Airway Classification:   []1 []2 [x]3 []4        Assessment and Plan     Diagnosis:    Problem List        Respiratory    MOLINA (obstructive sleep apnea)      He has symptoms of MOLINA with Afib and EDS  Advised to go for the PSG  Loose weight         Relevant Orders    Baseline Diagnostic Sleep Study       Other    Obesity (BMI 30-39. 9)      Advised to loose weight with diet and exercise           Hypersomnia      Advised to go for the PSG  Loose weight                     Additional Plan:     [x]  Sleep hygiene/ relaxation methods & CBTi principles review with patient   [x]  Avoid supine/back sleep until sleep study   [x]  Driving precautions   [x]  Medical consequences of untreated MOLINA   [x]  Weight loss recommendations   [x]  Diet recommendations   [x]  Exercise   []  Advised to quit smoking       []  PFT referral   []  Bariatric Program referral      Follow-Up:    No follow-ups on file. --Yudith Gupta MD on 4/20/2022 at 3:26 PM    An electronic signature was used to authenticate this note.      Electronically signed by Yudith Gupta MD on 4/20/2022 at 3:26 PM

## 2022-04-26 ENCOUNTER — TELEPHONE (OUTPATIENT)
Dept: CARDIOLOGY CLINIC | Age: 52
End: 2022-04-26

## 2022-04-26 PROCEDURE — 93227 XTRNL ECG REC<48 HR R&I: CPT | Performed by: INTERNAL MEDICINE

## 2022-04-26 NOTE — TELEPHONE ENCOUNTER
Conclusion: Normal study suggesting normal sinus rhythm without any significant ectopy or arrhythmias. Pt notified of Holter Monitor test results; pt voiced understanding.

## 2022-07-11 ENCOUNTER — HOSPITAL ENCOUNTER (OUTPATIENT)
Dept: SLEEP CENTER | Age: 52
Discharge: HOME | End: 2022-07-11
Payer: COMMERCIAL

## 2022-07-11 DIAGNOSIS — G47.33 OSA (OBSTRUCTIVE SLEEP APNEA): ICD-10-CM

## 2022-07-11 DIAGNOSIS — G47.10 HYPERSOMNIA: ICD-10-CM

## 2022-07-11 PROCEDURE — G0398 HOME SLEEP TEST/TYPE 2 PORTA: HCPCS

## 2022-07-11 NOTE — PROGRESS NOTES
Jim Hall  1970  arrived at [address] on 7/11/2022 for set up and instruction of home sleep study with the [facility] unit. he was instructed on proper set-up and operation of HST unit. Written instructions with set up diagram given for reference and reinforcement of verbal instruction and demonstration. he was able to return demonstration appropriately. No home environment, vision, dexterity, comprehension concerns with this patient based on completed forms and patient interactions. Patient will return unit after 2 nights as instructed.     Electronically signed by Zeny Huffman on 7/11/2022 at 4:29 PM

## 2022-07-20 PROCEDURE — 95806 SLEEP STUDY UNATT&RESP EFFT: CPT | Performed by: INTERNAL MEDICINE

## 2022-07-27 ENCOUNTER — HOSPITAL ENCOUNTER (OUTPATIENT)
Dept: SLEEP CENTER | Age: 52
Discharge: HOME | End: 2022-07-27
Payer: COMMERCIAL

## 2022-07-27 DIAGNOSIS — E66.9 OBESITY (BMI 30-39.9): ICD-10-CM

## 2022-07-27 DIAGNOSIS — G47.10 HYPERSOMNIA: ICD-10-CM

## 2022-07-27 DIAGNOSIS — G47.33 OSA (OBSTRUCTIVE SLEEP APNEA): ICD-10-CM

## 2022-07-27 PROCEDURE — 9990000010 HC NO CHARGE VISIT

## 2022-07-27 PROCEDURE — 99214 OFFICE O/P EST MOD 30 MIN: CPT | Performed by: INTERNAL MEDICINE

## 2022-07-27 ASSESSMENT — ENCOUNTER SYMPTOMS
ABDOMINAL PAIN: 0
BACK PAIN: 0
COUGH: 0
SHORTNESS OF BREATH: 0
EYE ITCHING: 0
EYE DISCHARGE: 0
ABDOMINAL DISTENTION: 0

## 2022-07-27 NOTE — PROGRESS NOTES
Alcohol use: Never    Drug use: Never       Review of Systems   Constitutional:  Positive for fatigue. HENT:  Negative for congestion and postnasal drip. Eyes:  Negative for discharge and itching. Respiratory:  Negative for cough and shortness of breath. Cardiovascular:  Negative for chest pain and leg swelling. Gastrointestinal:  Negative for abdominal distention and abdominal pain. Endocrine: Negative for cold intolerance and heat intolerance. Genitourinary:  Negative for enuresis and frequency. Musculoskeletal:  Negative for arthralgias and back pain. Allergic/Immunologic: Negative for environmental allergies and food allergies. Neurological:  Negative for light-headedness and headaches. Hematological:  Negative for adenopathy. Psychiatric/Behavioral:  Negative for agitation and behavioral problems. Objective: There were no vitals taken for this visit. There is no height or weight on file to calculate BMI. Sleep Medicine 4/20/2022   Neck circumference (Inches) 18     Mallampati 4    Physical Exam  Vitals reviewed. Constitutional:       Appearance: Normal appearance. HENT:      Head: Normocephalic and atraumatic. Nose: Nose normal.      Mouth/Throat:      Mouth: Mucous membranes are moist.   Eyes:      Extraocular Movements: Extraocular movements intact. Pupils: Pupils are equal, round, and reactive to light. Cardiovascular:      Rate and Rhythm: Normal rate and regular rhythm. Pulses: Normal pulses. Heart sounds: Normal heart sounds. Pulmonary:      Effort: Pulmonary effort is normal.      Breath sounds: Normal breath sounds. Abdominal:      General: Abdomen is flat. Palpations: Abdomen is soft. Musculoskeletal:         General: Normal range of motion. Cervical back: Normal range of motion and neck supple. Skin:     General: Skin is warm and dry. Neurological:      General: No focal deficit present.       Mental Status: He is alert and oriented to person, place, and time. Psychiatric:         Mood and Affect: Mood normal.         Behavior: Behavior normal.       Radiology: None    Assessment and Plan     Problem List          Respiratory    MOLINA (obstructive sleep apnea)      He has severe MOLINA  Advised to go for the Auto CPAP  Loose weight  Need 2 week download data         Relevant Orders    DME Order for CPAP as OP       Other    Obesity (BMI 30-39. 9)     Advised to loose weight with diet and exercise           Hypersomnia       He has severe MOLINA  Advised to go for the Auto CPAP  Loose weight  Need 2 week download data                   Follow-Up:    No follow-ups on file.      Progress notes sent to the referring Provider    Giovanny Davies MD MD  7/27/2022  2:31 PM

## 2022-09-14 RX ORDER — METOPROLOL SUCCINATE 25 MG/1
TABLET, EXTENDED RELEASE ORAL
Qty: 90 TABLET | Refills: 3 | Status: SHIPPED | OUTPATIENT
Start: 2022-09-14

## 2022-10-03 LAB
BASOPHILS ABSOLUTE: 0.04 /ΜL
BASOPHILS RELATIVE PERCENT: 0.5 %
CHOLESTEROL, TOTAL: 550 MG/DL
CHOLESTEROL/HDL RATIO: NORMAL
EOSINOPHILS ABSOLUTE: 0.13 /ΜL
EOSINOPHILS RELATIVE PERCENT: 1.6 %
HCT VFR BLD CALC: 44.1 % (ref 41–53)
HDLC SERPL-MCNC: 37 MG/DL (ref 35–70)
HEMOGLOBIN: 13.7 G/DL (ref 13.5–17.5)
LDL CHOLESTEROL CALCULATED: NORMAL
LYMPHOCYTES ABSOLUTE: 2.9 /ΜL
LYMPHOCYTES RELATIVE PERCENT: 36.3 %
MCH RBC QN AUTO: 25.5 PG
MCHC RBC AUTO-ENTMCNC: 31.1 G/DL
MCV RBC AUTO: 82 FL
MONOCYTES ABSOLUTE: 0.47 /ΜL
MONOCYTES RELATIVE PERCENT: 5.9 %
NEUTROPHILS ABSOLUTE: 4.44 /ΜL
NEUTROPHILS RELATIVE PERCENT: 55.4 %
NONHDLC SERPL-MCNC: NORMAL MG/DL
PDW BLD-RTO: 14.2 %
PLATELET # BLD: 287 K/ΜL
PMV BLD AUTO: 11.1 FL
RBC # BLD: 5.38 10^6/ΜL
TRIGL SERPL-MCNC: 1781 MG/DL
VLDLC SERPL CALC-MCNC: NORMAL MG/DL
WBC # BLD: 8 10^3/ML

## 2022-10-05 ENCOUNTER — TELEPHONE (OUTPATIENT)
Dept: CARDIOLOGY CLINIC | Age: 52
End: 2022-10-05

## 2022-10-05 NOTE — TELEPHONE ENCOUNTER
Called patient regarding the result of labs (Lipid Panel). Triglycerides and Cholesterol is significantly high. Dr Geri Snyder wants to see him sooner whenever available. Talked to his wife who understands and speaks English better. Scheduled him for [address] as she said they don't know anything around [place]. Agreed to bring all meds.

## 2022-10-10 ENCOUNTER — OFFICE VISIT (OUTPATIENT)
Dept: CARDIOLOGY CLINIC | Age: 52
End: 2022-10-10
Payer: COMMERCIAL

## 2022-10-10 VITALS
WEIGHT: 270 LBS | HEIGHT: 70 IN | SYSTOLIC BLOOD PRESSURE: 120 MMHG | HEART RATE: 60 BPM | BODY MASS INDEX: 38.65 KG/M2 | DIASTOLIC BLOOD PRESSURE: 64 MMHG

## 2022-10-10 DIAGNOSIS — E78.2 MIXED HYPERLIPIDEMIA: Primary | ICD-10-CM

## 2022-10-10 DIAGNOSIS — I48.0 PAF (PAROXYSMAL ATRIAL FIBRILLATION) (HCC): ICD-10-CM

## 2022-10-10 DIAGNOSIS — E66.9 OBESITY (BMI 30-39.9): ICD-10-CM

## 2022-10-10 DIAGNOSIS — G47.33 OSA (OBSTRUCTIVE SLEEP APNEA): ICD-10-CM

## 2022-10-10 DIAGNOSIS — R06.02 SOB (SHORTNESS OF BREATH): ICD-10-CM

## 2022-10-10 PROBLEM — E66.812 CLASS 2 OBESITY IN ADULT: Status: RESOLVED | Noted: 2022-04-19 | Resolved: 2022-10-10

## 2022-10-10 PROBLEM — G47.10 HYPERSOMNIA: Status: RESOLVED | Noted: 2022-04-20 | Resolved: 2022-10-10

## 2022-10-10 PROCEDURE — 93000 ELECTROCARDIOGRAM COMPLETE: CPT | Performed by: INTERNAL MEDICINE

## 2022-10-10 PROCEDURE — 99214 OFFICE O/P EST MOD 30 MIN: CPT | Performed by: INTERNAL MEDICINE

## 2022-10-10 RX ORDER — FENOFIBRATE 145 MG/1
145 TABLET, COATED ORAL DAILY
Qty: 90 TABLET | Refills: 3 | Status: SHIPPED | OUTPATIENT
Start: 2022-10-10

## 2022-10-10 RX ORDER — ATORVASTATIN CALCIUM 80 MG/1
80 TABLET, FILM COATED ORAL DAILY
Qty: 90 TABLET | Refills: 3 | Status: SHIPPED | OUTPATIENT
Start: 2022-10-10

## 2022-10-10 RX ORDER — ICOSAPENT ETHYL 1000 MG/1
2 CAPSULE ORAL 2 TIMES DAILY
Qty: 60 CAPSULE | Refills: 3 | Status: SHIPPED | OUTPATIENT
Start: 2022-10-10

## 2022-10-10 NOTE — ASSESSMENT & PLAN NOTE
Counseled extensively for diet and exercise to lose weight. Patient has gained 13 pounds since last visit.

## 2022-10-10 NOTE — PROGRESS NOTES
Noe Hyman  1970  [place]      Chief Complaint   Patient presents with    Atrial Fibrillation    Hyperlipidemia    Palpitations     OV for 3 month check. Pt denies any dizziness and swelling to ankles. He still has some chest discomfort,SOB and palpitations sometimes. SX are no worse since last visit. Chief complaint and HPI:  Noe Hyman  is a 45 y.o. male following up obesity and hyperlipidemia and has obstructive sleep apnea and dyspnea on exertion. He denies any chest pain but has indigestion. He does not exercise at all and he is very sedentary and he is accompanied by his wife who keeps reminding him. Apparently he used to work at [employer] and had back injury and he says he has shooting pain in the back and the right thigh and it gets numb when he stands too long or tries to walk too long. He has been seen by specialist for this as workman composition has gone through physical therapy and exercises but not continuing them now. He understands [place] and his wife is with him who makes things very clear to him. She speaks better [place]. She is using CPAP machine for obstructive sleep apnea. Rest of the Cardiovascular system review is otherwise unchanged from prior encounter. Past medical history:  has a past medical history of Atypical chest pain, GERD (gastroesophageal reflux disease), H/O Doppler echocardiogram, History of exercise stress test, History of Holter monitoring, Hyperlipidemia, Hypertriglyceridemia, and Palpitations. Past surgical history:  has no past surgical history on file. Social History:   Social History     Tobacco Use    Smoking status: Never    Smokeless tobacco: Never   Substance Use Topics    Alcohol use: Never     Family history: family history is not on file. ALLERGIES:  Patient has no known allergies. Prior to Admission medications    Medication Sig Start Date End Date Taking?  Authorizing Provider   CPAP Machine MISC by Does not apply route Yes Historical Provider, MD   atorvastatin (LIPITOR) 80 MG tablet Take 1 tablet by mouth daily 10/10/22  Yes Kenyon Portillo MD   fenofibrate [facility]) 145 MG tablet Take 1 tablet by mouth daily 10/10/22  Yes Kenyon Portillo MD   Icosapent Ethyl (VASCEPA) 1 g CAPS capsule Take 2 capsules by mouth 2 times daily 10/10/22  Yes Kenyon Portillo MD   metoprolol succinate (TOPROL XL) 25 MG extended release tablet TAKE 1 TABLET BY MOUTH EVERY DAY 9/14/22  Yes Kenyon Portillo MD   ibuprofen (ADVIL;MOTRIN) 800 MG tablet  1/14/22  Yes Historical Provider, MD   omeprazole (PRILOSEC) 40 MG delayed release capsule  12/15/21  Yes Historical Provider, MD   sucralfate (CARAFATE) 1 GM tablet Take 1 tablet before each meal and then 1 tablet before bed for a total of 4 times daily. 1/18/22  Yes Historical Provider, MD   gabapentin (NEURONTIN) 100 MG capsule  1/14/22  Yes Historical Provider, MD   aspirin (ASPIRIN CHILDRENS) 81 MG chewable tablet Take 1 tablet by mouth daily  Patient not taking: Reported on 10/10/2022 4/19/22   Kenyon Portillo MD     Vitals:    10/10/22 0903   BP: 120/64   Pulse: 60   Weight: 270 lb (122.5 kg)   Height: 5' 10\" (1.778 m)      Body mass index is 38.74 kg/m². Wt Readings from Last 3 Encounters:   10/10/22 270 lb (122.5 kg)   04/20/22 275 lb (124.7 kg)   04/19/22 257 lb (116.6 kg)     Constitutional:  Patient is obese male in no apparent distress. He gained 13 pounds since last visit with me on 4/19/2022. Eyes: Wearing glasses. No conjunctival pallor noted. NECK: No JVP or thyromegaly  Cardiovascular: Auscultation: Normal S1 and S2.  Murmurs or gallops noted. Carotids are negative for bruits. Respiratory:  Respiratory effort is normal. Breath sounds are clear to auscultation. Extremities:  No edema, clubbing,  Cyanosis, petechiae. SKIN: Warm and well perfused, no pallor or cyanosis  Abdomen:  No masses or tenderness. No organomegaly noted.   Neurologic:  Oriented to time, place, and person and non-anxious. No focal neurological deficit noted. Psychiatric: Normal mood and effect. EKG is consistent with normal sinus rhythm 60 bpm essentially normal EKG. 48 hours of ambulatory cardiac monitoring done to evaluate palpitations. Rhythm is normal sinus average rate is 72 bpm.  Minimum heart rate of 44 bpm occurred at 8:47 AM and the maximal rate of 112 bpm occurred at 3:29 PM.  Rare isolated 2 PVCs and 14 PACs are present. Patient did not submit the diary for any activity correlations. Conclusion: Normal study suggesting normal sinus rhythm without any significant ectopy or arrhythmias. LAB REVIEW:  CBC:   Lab Results   Component Value Date/Time    WBC 8.00 10/03/2022 12:00 AM    HGB 13.7 10/03/2022 12:00 AM    HCT 44.1 10/03/2022 12:00 AM     10/03/2022 12:00 AM     Lipids:   Lab Results   Component Value Date    CHOL 550 10/03/2022    CHOLFAST 321 (H) 03/22/2022    TRIG 1,781 10/03/2022    TRIGLYCFAST 428 (H) 03/22/2022    HDL 37 10/03/2022    [address]        03/22/2022     Renal:   Lab Results   Component Value Date/Time    BUN 15 03/14/2022 02:30 AM    CREATININE 0.7 03/14/2022 02:30 AM     03/14/2022 02:30 AM    K 5.3 03/14/2022 02:30 AM     IMPRESSION and RECOMMENDATIONS:      1. Mixed hyperlipidemia  Assessment & Plan:  His triglycerides are more than 1000 total cholesterol is 500. Increase Lipitor to 80 mg daily and add Vascepa 2 g twice a day and fenofibrate 145 mg daily and repeat lipids in 2 to 3 months. Counseled for weight management and dietary modifications. Orders:  -     Lipid Panel; Standing  -     Hepatic Function Panel; Future  2. SOB (shortness of breath)  -     EKG 12 lead; Future  3. MOLINA (obstructive sleep apnea)  Assessment & Plan:  Continue CPAP therapy compliance. Weight loss will help. 4. Obesity (BMI 30-39. 9)  Assessment & Plan:  Counseled extensively for diet and exercise to lose weight. Patient has gained 13 pounds since last visit.    5. PAF (paroxysmal atrial fibrillation) (HCC)  Assessment & Plan:  48-hour Holter monitor was negative for any atrial fibrillation. Continue Toprol and aspirin. Increase Lipitor to 80 mg daily and add Fenofibrate 145 mg daily and Vascepa 2 gm every 12 hours. Patient is counseled for low cholesterol, low sodium and low fat diet. Counseled for calorie counting, reduction in serving size and exercise and lifestyle modification for weight loss. Repeat lipids and LFT in 3 months. Also counseled for increase in fresh fruits and vegetables and healthy lifestyles. Appropriate prescriptions if needed on this visit are addressed. After visit summery is provided. Questions answered and patient verbalizes understanding. Follow up in 3 months,  sooner if needed. Benito Callejas MD, 10/10/2022 9:42 AM     Please note this report has been partially produced using speech recognition software and may contain errors related to that system including errors in grammar, punctuation, and spelling, as well as words and phrases that may be inappropriate. If there are any questions or concerns please feel free to contact the dictating provider for clarification.

## 2022-10-10 NOTE — ASSESSMENT & PLAN NOTE
His triglycerides are more than 1000 total cholesterol is 500. Increase Lipitor to 80 mg daily and add Vascepa 2 g twice a day and fenofibrate 145 mg daily and repeat lipids in 2 to 3 months. Counseled for weight management and dietary modifications.

## 2022-10-10 NOTE — PATIENT INSTRUCTIONS
Increase Lipitor to 80 mg daily and add Fenofibrate 145 mg daily and Vascepa 2 gm every 12 hours. Patient is counseled for low cholesterol, low sodium and low fat diet. Counseled for calorie counting, reduction in serving size and exercise and lifestyle modification for weight loss. Repeat lipids and LFT in 3 months. Also counseled for increase in fresh fruits and vegetables and healthy lifestyles. Appropriate prescriptions if needed on this visit are addressed. After visit summery is provided. Questions answered and patient verbalizes understanding. Follow up in 3 months,  sooner if needed.

## 2022-10-11 ENCOUNTER — APPOINTMENT (OUTPATIENT)
Dept: CT IMAGING | Age: 52
End: 2022-10-11
Payer: MEDICAID

## 2022-10-11 ENCOUNTER — HOSPITAL ENCOUNTER (EMERGENCY)
Age: 52
Discharge: HOME | End: 2022-10-11
Attending: EMERGENCY MEDICINE
Payer: MEDICAID

## 2022-10-11 VITALS
SYSTOLIC BLOOD PRESSURE: 114 MMHG | RESPIRATION RATE: 18 BRPM | WEIGHT: 270 LBS | DIASTOLIC BLOOD PRESSURE: 60 MMHG | TEMPERATURE: 98 F | OXYGEN SATURATION: 95 % | BODY MASS INDEX: 38.74 KG/M2 | HEART RATE: 68 BPM

## 2022-10-11 DIAGNOSIS — R10.9 FLANK PAIN: Primary | ICD-10-CM

## 2022-10-11 LAB
ALBUMIN SERPL-MCNC: 4.6 GM/DL (ref 3.4–5)
ALP BLD-CCNC: 100 IU/L (ref 40–129)
ALT SERPL-CCNC: 35 U/L (ref 10–40)
ANION GAP SERPL CALCULATED.3IONS-SCNC: 10 MMOL/L (ref 4–16)
AST SERPL-CCNC: 22 IU/L (ref 15–37)
BASOPHILS ABSOLUTE: 0.1 K/CU MM
BASOPHILS RELATIVE PERCENT: 0.5 % (ref 0–1)
BILIRUB SERPL-MCNC: 0.3 MG/DL (ref 0–1)
BILIRUBIN URINE: NEGATIVE
BLOOD, URINE: NEGATIVE
BUN BLDV-MCNC: 15 MG/DL (ref 6–23)
CALCIUM SERPL-MCNC: 9.6 MG/DL (ref 8.3–10.6)
CHLORIDE BLD-SCNC: 104 MMOL/L (ref 99–110)
CLARITY: CLEAR
CO2: 25 MMOL/L (ref 21–32)
COLOR: YELLOW
CREAT SERPL-MCNC: 0.7 MG/DL (ref 0.9–1.3)
DIFFERENTIAL TYPE: ABNORMAL
EOSINOPHILS ABSOLUTE: 0.1 K/CU MM
EOSINOPHILS RELATIVE PERCENT: 1 % (ref 0–3)
GFR AFRICAN AMERICAN: >60 ML/MIN/1.73M2
GFR NON-AFRICAN AMERICAN: >60 ML/MIN/1.73M2
GLUCOSE BLD-MCNC: 89 MG/DL (ref 70–99)
GLUCOSE, URINE: NEGATIVE MG/DL
HCT VFR BLD CALC: 44.2 % (ref 42–52)
HEMOGLOBIN: 13.7 GM/DL (ref 13.5–18)
IMMATURE NEUTROPHIL %: 0.2 % (ref 0–0.43)
KETONES, URINE: NEGATIVE MG/DL
LEUKOCYTE ESTERASE, URINE: NEGATIVE
LYMPHOCYTES ABSOLUTE: 2.9 K/CU MM
LYMPHOCYTES RELATIVE PERCENT: 31.3 % (ref 24–44)
MCH RBC QN AUTO: 25.1 PG (ref 27–31)
MCHC RBC AUTO-ENTMCNC: 31 % (ref 32–36)
MCV RBC AUTO: 81 FL (ref 78–100)
MONOCYTES ABSOLUTE: 0.5 K/CU MM
MONOCYTES RELATIVE PERCENT: 5.4 % (ref 0–4)
NITRITE URINE, QUANTITATIVE: NEGATIVE
PDW BLD-RTO: 14.2 % (ref 11.7–14.9)
PH, URINE: 6.5
PLATELET # BLD: 261 K/CU MM (ref 140–440)
PMV BLD AUTO: 10.4 FL (ref 7.5–11.1)
POTASSIUM SERPL-SCNC: 4.5 MMOL/L (ref 3.5–5.1)
PROTEIN UA: NEGATIVE MG/DL
RBC # BLD: 5.46 M/CU MM (ref 4.6–6.2)
SEGMENTED NEUTROPHILS ABSOLUTE COUNT: 5.7 K/CU MM
SEGMENTED NEUTROPHILS RELATIVE PERCENT: 61.6 % (ref 36–66)
SODIUM BLD-SCNC: 139 MMOL/L (ref 135–145)
SPECIFIC GRAVITY UA: 1.02
TOTAL IMMATURE NEUTOROPHIL: 0.02 K/CU MM
TOTAL PROTEIN: 7.3 GM/DL (ref 6.4–8.2)
UROBILINOGEN, URINE: 0.2 MG/DL
WBC # BLD: 9.3 K/CU MM (ref 4–10.5)

## 2022-10-11 PROCEDURE — 81003 URINALYSIS AUTO W/O SCOPE: CPT

## 2022-10-11 PROCEDURE — 85025 COMPLETE CBC W/AUTO DIFF WBC: CPT

## 2022-10-11 PROCEDURE — 99284 EMERGENCY DEPT VISIT MOD MDM: CPT

## 2022-10-11 PROCEDURE — 74176 CT ABD & PELVIS W/O CONTRAST: CPT

## 2022-10-11 PROCEDURE — 80053 COMPREHEN METABOLIC PANEL: CPT

## 2022-10-11 PROCEDURE — 6370000000 HC RX 637 (ALT 250 FOR IP): Performed by: EMERGENCY MEDICINE

## 2022-10-11 RX ORDER — LIDOCAINE 50 MG/G
1 PATCH TOPICAL DAILY
Qty: 10 PATCH | Refills: 0 | Status: SHIPPED | OUTPATIENT
Start: 2022-10-11 | End: 2022-10-21

## 2022-10-11 RX ORDER — OXYCODONE HYDROCHLORIDE AND ACETAMINOPHEN 5; 325 MG/1; MG/1
1 TABLET ORAL ONCE
Status: COMPLETED | OUTPATIENT
Start: 2022-10-11 | End: 2022-10-11

## 2022-10-11 RX ORDER — IBUPROFEN 600 MG/1
600 TABLET ORAL 3 TIMES DAILY PRN
Qty: 30 TABLET | Refills: 0 | Status: SHIPPED | OUTPATIENT
Start: 2022-10-11

## 2022-10-11 RX ORDER — METHOCARBAMOL 500 MG/1
500 TABLET, FILM COATED ORAL 4 TIMES DAILY PRN
Qty: 40 TABLET | Refills: 0 | Status: SHIPPED | OUTPATIENT
Start: 2022-10-11 | End: 2022-10-21

## 2022-10-11 RX ORDER — HYDROCHLOROTHIAZIDE 12.5 MG/1
12.5 TABLET ORAL DAILY
COMMUNITY
Start: 2022-10-03

## 2022-10-11 RX ORDER — IBUPROFEN 600 MG/1
600 TABLET ORAL ONCE
Status: COMPLETED | OUTPATIENT
Start: 2022-10-11 | End: 2022-10-11

## 2022-10-11 RX ORDER — METHYLPREDNISOLONE 4 MG/1
4 TABLET ORAL
COMMUNITY
Start: 2022-10-03

## 2022-10-11 RX ORDER — ALBUTEROL SULFATE 90 UG/1
1 AEROSOL, METERED RESPIRATORY (INHALATION) EVERY 6 HOURS PRN
COMMUNITY
Start: 2022-10-03

## 2022-10-11 RX ORDER — LIDOCAINE 4 G/G
1 PATCH TOPICAL DAILY
Status: DISCONTINUED | OUTPATIENT
Start: 2022-10-11 | End: 2022-10-12 | Stop reason: HOSPADM

## 2022-10-11 RX ADMIN — OXYCODONE AND ACETAMINOPHEN 1 TABLET: 5; 325 TABLET ORAL at 20:37

## 2022-10-11 RX ADMIN — IBUPROFEN 600 MG: 600 TABLET ORAL at 20:37

## 2022-10-11 ASSESSMENT — PAIN SCALES - GENERAL
PAINLEVEL_OUTOF10: 9
PAINLEVEL_OUTOF10: 8

## 2022-10-11 ASSESSMENT — PAIN - FUNCTIONAL ASSESSMENT: PAIN_FUNCTIONAL_ASSESSMENT: 0-10

## 2022-10-12 ASSESSMENT — ENCOUNTER SYMPTOMS
BACK PAIN: 0
EYE PAIN: 0
COUGH: 0
SORE THROAT: 0
ABDOMINAL PAIN: 0
EYE DISCHARGE: 0
RHINORRHEA: 0
NAUSEA: 0
VOMITING: 0
SHORTNESS OF BREATH: 0

## 2022-10-12 NOTE — ED PROVIDER NOTES
asymmetry, light-headedness, numbness and headaches. Psychiatric/Behavioral:  Negative for confusion. Except as noted above the remainder of the review of systems was reviewed and negative. PAST MEDICAL HISTORY     Past Medical History:   Diagnosis Date    Atypical chest pain 03/22/2022    GERD (gastroesophageal reflux disease)     H/O Doppler echocardiogram 03/30/2022    EF 55-60. LA mild to moderately dilated. Sclerotic but non-stenotic aortic valve. RVSP 19. History of exercise stress test 03/30/2022    Normal near maximal study negative for angina or ECG evidence of ischemia. History of Holter monitoring 04/26/2022    Normal study suggesting normal sinus rhythm without any significant ectopy or arrhythmias. Hyperlipidemia     Hypertriglyceridemia     Palpitations 03/22/2022       Prior to Admission medications    Medication Sig Start Date End Date Taking?  Authorizing Provider   albuterol sulfate HFA (PROVENTIL;VENTOLIN;PROAIR) 108 (90 Base) MCG/ACT inhaler Inhale 1 puff into the lungs every 6 hours as needed 10/3/22  Yes Historical Provider, MD   hydroCHLOROthiazide (HYDRODIURIL) 12.5 MG tablet Take 12.5 mg by mouth daily 10/3/22  Yes Historical Provider, MD   methylPREDNISolone (MEDROL DOSEPACK) 4 MG tablet Take 4 mg by mouth 10/3/22  Yes Historical Provider, MD   ibuprofen (ADVIL;MOTRIN) 600 MG tablet Take 1 tablet by mouth 3 times daily as needed for Pain 10/11/22  Yes Rogerio Junior MD   lidocaine (LIDODERM) 5 % Place 1 patch onto the skin daily for 10 days 12 hours on, 12 hours off. 10/11/22 10/21/22 Yes Rogerio Junior MD   methocarbamol (ROBAXIN) 500 MG tablet Take 1 tablet by mouth 4 times daily as needed (for pain/spasms) 10/11/22 10/21/22 Yes Rogerio Junior MD   CPAP Machine MISC by Does not apply route    Historical Provider, MD   atorvastatin (LIPITOR) 80 MG tablet Take 1 tablet by mouth daily 10/10/22   Tyler Yusuf MD   fenofibrate (TRICOR) 145 MG tablet Take 1 tablet by mouth daily 10/10/22   Rogelio Zimmerman MD   Icosapent Ethyl (VASCEPA) 1 g CAPS capsule Take 2 capsules by mouth 2 times daily 10/10/22   Rogelio Zimmerman MD   metoprolol succinate (TOPROL XL) 25 MG extended release tablet TAKE 1 TABLET BY MOUTH EVERY DAY 9/14/22   Rogelio Zimmerman MD   aspirin (ASPIRIN CHILDRENS) 81 MG chewable tablet Take 1 tablet by mouth daily  Patient not taking: Reported on 10/10/2022 4/19/22   Rogelio Zimmerman MD   omeprazole (PRILOSEC) 40 MG delayed release capsule  12/15/21   Historical Provider, MD   sucralfate (CARAFATE) 1 GM tablet Take 1 tablet before each meal and then 1 tablet before bed for a total of 4 times daily. 1/18/22   Historical Provider, MD   gabapentin (NEURONTIN) 100 MG capsule  1/14/22   Historical Provider, MD        Patient Active Problem List   Diagnosis    Atypical chest pain    Hyperlipidemia    PAF (paroxysmal atrial fibrillation) (HCC)    Palpitations    MOLINA (obstructive sleep apnea)    Obesity (BMI 30-39. 9)         SURGICAL HISTORY     History reviewed. No pertinent surgical history.       CURRENT MEDICATIONS       Discharge Medication List as of 10/11/2022 10:01 PM        CONTINUE these medications which have NOT CHANGED    Details   albuterol sulfate HFA (PROVENTIL;VENTOLIN;PROAIR) 108 (90 Base) MCG/ACT inhaler Inhale 1 puff into the lungs every 6 hours as neededHistorical Med      hydroCHLOROthiazide (HYDRODIURIL) 12.5 MG tablet Take 12.5 mg by mouth dailyHistorical Med      methylPREDNISolone (MEDROL DOSEPACK) 4 MG tablet Take 4 mg by mouthHistorical Med      CPAP Machine MISC Historical Med      atorvastatin (LIPITOR) 80 MG tablet Take 1 tablet by mouth daily, Disp-90 tablet, R-3Normal      fenofibrate (TRICOR) 145 MG tablet Take 1 tablet by mouth daily, Disp-90 tablet, R-3Normal      Icosapent Ethyl (VASCEPA) 1 g CAPS capsule Take 2 capsules by mouth 2 times daily, Disp-60 capsule, R-3Normal      metoprolol succinate (TOPROL XL) 25 MG extended release tablet TAKE 1 TABLET BY MOUTH EVERY DAY, Disp-90 tablet, R-3Normal      aspirin (ASPIRIN CHILDRENS) 81 MG chewable tablet Take 1 tablet by mouth daily, Disp-100 tablet, R-2Normal      omeprazole (PRILOSEC) 40 MG delayed release capsule Historical Med      sucralfate (CARAFATE) 1 GM tablet Take 1 tablet before each meal and then 1 tablet before bed for a total of 4 times daily. Historical Med      gabapentin (NEURONTIN) 100 MG capsule Historical Med             ALLERGIES     Patient has no known allergies. FAMILY HISTORY     History reviewed. No pertinent family history. SOCIAL HISTORY       Social History     Socioeconomic History    Marital status:      Spouse name: None    Number of children: None    Years of education: None    Highest education level: None   Tobacco Use    Smoking status: Never    Smokeless tobacco: Never   Vaping Use    Vaping Use: Never used   Substance and Sexual Activity    Alcohol use: Never    Drug use: Never       SCREENINGS    Becka Coma Scale  Eye Opening: Spontaneous  Best Verbal Response: Oriented  Best Motor Response: Obeys commands  [place] Coma Scale Score: 15          PHYSICAL EXAM    (up to 7 for level 4, 8 or more for level 5)     ED Triage Vitals [10/11/22 1927]   BP Temp Temp Source Heart Rate Resp SpO2 Height Weight   (!) 113/56 98 °F (36.7 °C) Oral 69 18 95 % -- 270 lb (122.5 kg)       Physical Exam  Vitals reviewed. Constitutional:       Appearance: He is not ill-appearing or toxic-appearing. HENT:      Head: Normocephalic and atraumatic. Nose: Nose normal.      Mouth/Throat:      Mouth: Mucous membranes are moist.   Eyes:      General:         Right eye: No discharge. Left eye: No discharge. Extraocular Movements: Extraocular movements intact. Pupils: Pupils are equal, round, and reactive to light. Cardiovascular:      Rate and Rhythm: Normal rate. Heart sounds: No friction rub. No gallop.    Pulmonary: Effort: Pulmonary effort is normal.      Breath sounds: No stridor. No wheezing or rhonchi. Abdominal:      Palpations: Abdomen is soft. Tenderness: There is abdominal tenderness. There is no guarding. Comments: Mildly reproducible left flank tenderness   Musculoskeletal:         General: No deformity. Normal range of motion. Cervical back: Normal range of motion and neck supple. Skin:     General: Skin is warm. Capillary Refill: Capillary refill takes less than 2 seconds. Neurological:      General: No focal deficit present. Mental Status: He is alert. DIAGNOSTIC RESULTS     Labs Reviewed   COMPREHENSIVE METABOLIC PANEL - Abnormal; Notable for the following components:       Result Value    Creatinine 0.7 (*)     All other components within normal limits   CBC WITH AUTO DIFFERENTIAL - Abnormal; Notable for the following components:    MCH 25.1 (*)     MCHC 31.0 (*)     Monocytes % 5.4 (*)     All other components within normal limits   URINALYSIS        RADIOLOGY:     Non-plain film images such as CT, Ultrasound and MRI are read by the radiologist. Plain radiographic images are visualized and preliminarily interpreted by the emergency physician. Interpretation per the Radiologist below, if available at the time of this note:    CT ABDOMEN PELVIS WO CONTRAST Additional Contrast? None   Final Result   No urinary tract stones or hydronephrosis. No acute bowel abnormality. Normal appendix.                ED BEDSIDE ULTRASOUND:   Performed by ED Physician Jesus Bojorquez MD       LABS:  Labs Reviewed   COMPREHENSIVE METABOLIC PANEL - Abnormal; Notable for the following components:       Result Value    Creatinine 0.7 (*)     All other components within normal limits   CBC WITH AUTO DIFFERENTIAL - Abnormal; Notable for the following components:    MCH 25.1 (*)     MCHC 31.0 (*)     Monocytes % 5.4 (*)     All other components within normal limits   URINALYSIS All other labs were within normal range or not returned as of this dictation. EMERGENCY DEPARTMENT COURSE and DIFFERENTIAL DIAGNOSIS/MDM:   Vitals:    Vitals:    10/11/22 1927 10/11/22 2201   BP: (!) 113/56 114/60   Pulse: 69 68   Resp: 18    Temp: 98 °F (36.7 °C)    TempSrc: Oral    SpO2: 95% 95%   Weight: 270 lb (122.5 kg)            MDM  Number of Diagnoses or Management Options  Flank pain  Diagnosis management comments: 57-year-old male presents complaining of some left-sided flank pain. Does have a history of back pain. Denies any significant trauma or injury. Denies a history of kidney stone or other significant urinary issues. He has not had fever, chills or constitutional infectious symptoms. No respiratory symptoms. He does take anti-inflammatories for his back pain. Not complain of any saddle paresthesias. No bowel or urinary incontinence. He is [place]-speaking. He speaks some [place]. Presents with wife who is also in speaking. They declined . Family does provide interpretation. He presents with unremarkable vital signs. Does have some reproducible left flank tenderness. Labs are obtained and are nonacute. Serum creatinine is within normal limits. No leukocytosis. CT of his abdomen is obtained and is nonacute. No kidney stone or other acute abnormality noted. Results discussed with patient and his wife. He is treated symptomatically the emergency department. Etiology seems most consistent with musculoskeletal pain. He will continue symptomatic treatment at home. He is amatory without difficulty. He is discharged in stable condition with return precautions. He will follow-up outpatient. -  Patient seen and evaluated in the emergency department. -  Triage and nursing notes reviewed and incorporated. -  Old chart records reviewed and incorporated.   -  Work-up included:  See above  -  Results discussed with patient. CONSULTS:  None    PROCEDURES:  None performed unless otherwise noted below     Procedures        FINAL IMPRESSION      1. Flank pain          DISPOSITION/PLAN   DISPOSITION Decision To Discharge 10/11/2022 09:51:24 PM      PATIENT REFERRED TO:  Riya He, Jose Elder [facility] [address]  [phone]    Schedule an appointment as soon as possible for a visit in 1 week      DISCHARGE MEDICATIONS:  Discharge Medication List as of 10/11/2022 10:01 PM        START taking these medications    Details   ibuprofen (ADVIL;MOTRIN) 600 MG tablet Take 1 tablet by mouth 3 times daily as needed for Pain, Disp-30 tablet, R-0Normal      lidocaine (LIDODERM) 5 % Place 1 patch onto the skin daily for 10 days 12 hours on, 12 hours off., Disp-10 patch, R-0Normal      methocarbamol (ROBAXIN) 500 MG tablet Take 1 tablet by mouth 4 times daily as needed (for pain/spasms), Disp-40 tablet, R-0Normal             ED Provider Disposition Time  DISPOSITION Decision To Discharge 10/11/2022 09:51:24 PM      Appropriate personal protective equipment was worn during the patient's evaluation. These included surgical, eye protection, surgical mask or in 95 respirator and gloves. The patient was also placed in a surgical mask for the prevention of possible spread of respiratory viral illnesses. The Patient was instructed to read the package inserts with any medication that was prescribed. Major potential reactions and medication interactions were discussed. The Patient understands that there are numerous possible adverse reactions not covered. The patient was also instructed to arrange follow-up with his or her primary care provider for review of any pending labwork or incidental findings on any radiology results that were obtained. All efforts were made to discuss any incidental findings that require further monitoring. Controlled Substances Monitoring:     No flowsheet data found.     (Please note that portions of this note were completed with a voice recognition program.  Efforts were made to edit the dictations but occasionally words are mis-transcribed.)    Lisbeth Nyhan, MD (electronically signed)  Attending Emergency Physician            Lisbeth Nyhan, MD  10/12/22 5991

## 2022-10-12 NOTE — DISCHARGE INSTRUCTIONS
Your CT scan today and labs were overall nonacute.      Your symptoms may be musculoskeletal.     You may find that measures like ice and/or heat and anti-inflammatory medications (ibuprofen, naproxen, tylenol)

## 2023-01-10 ENCOUNTER — OFFICE VISIT (OUTPATIENT)
Dept: CARDIOLOGY CLINIC | Age: 53
End: 2023-01-10
Payer: MEDICAID

## 2023-01-10 VITALS
DIASTOLIC BLOOD PRESSURE: 64 MMHG | SYSTOLIC BLOOD PRESSURE: 120 MMHG | HEIGHT: 61 IN | BODY MASS INDEX: 49.35 KG/M2 | WEIGHT: 261.4 LBS | HEART RATE: 80 BPM

## 2023-01-10 DIAGNOSIS — R00.2 PALPITATIONS: ICD-10-CM

## 2023-01-10 DIAGNOSIS — G47.33 OSA (OBSTRUCTIVE SLEEP APNEA): ICD-10-CM

## 2023-01-10 DIAGNOSIS — E66.9 OBESITY (BMI 30-39.9): ICD-10-CM

## 2023-01-10 DIAGNOSIS — I48.0 PAF (PAROXYSMAL ATRIAL FIBRILLATION) (HCC): ICD-10-CM

## 2023-01-10 DIAGNOSIS — E78.2 MIXED HYPERLIPIDEMIA: Primary | ICD-10-CM

## 2023-01-10 PROCEDURE — 99213 OFFICE O/P EST LOW 20 MIN: CPT | Performed by: INTERNAL MEDICINE

## 2023-01-10 NOTE — PATIENT INSTRUCTIONS
Continue current cardiovascular medications which have been reviewed and discussed individually with you. Have lipids and LFT done. Continue to lose weight. Appropriate prescriptions if needed on this visit are addressed. After visit summery is provided. Questions answered and patient verbalizes understanding. Follow up in 6 months,  sooner if needed.

## 2023-01-10 NOTE — ASSESSMENT & PLAN NOTE
Had not had much recurrence since recent episode of March 2022. Continue optimization of CPAP. Continue Toprol.

## 2023-01-10 NOTE — PROGRESS NOTES
Joycelyn Summers  1970  [place]      Chief Complaint   Patient presents with    Hyperlipidemia    Congestive Heart Failure     Pt not having chest pain, pt is having dizziness and SOB no palpitations        Chief complaint and HPI:  Joycelyn Summers  is a 45 y.o. male following up for mixed hyperlipidemia and obesity and history of obstructive sleep apnea. His last visit 3 months ago we had started him on fenofibrate and increased Lipitor to 80 mg daily which she seems to be tolerating it well however he did not have repeat lipids done. He has chronic Back problem unable to exercise. He is getting injections without much help. He speaks little [place] and is accompanied by his family member who translates. Rest of the Cardiovascular system review is otherwise unchanged from prior encounter. Past medical history:  has a past medical history of Atypical chest pain, GERD (gastroesophageal reflux disease), H/O Doppler echocardiogram, History of exercise stress test, History of Holter monitoring, Hyperlipidemia, Hypertriglyceridemia, and Palpitations. Past surgical history:  has no past surgical history on file. Social History:   Social History     Tobacco Use    Smoking status: Never    Smokeless tobacco: Never   Substance Use Topics    Alcohol use: Never     Family history: family history is not on file. ALLERGIES:  Patient has no known allergies. Prior to Admission medications    Medication Sig Start Date End Date Taking?  Authorizing Provider   albuterol sulfate HFA (PROVENTIL;VENTOLIN;PROAIR) 108 (90 Base) MCG/ACT inhaler Inhale 1 puff into the lungs every 6 hours as needed 10/3/22  Yes Historical Provider, MD   hydroCHLOROthiazide (HYDRODIURIL) 12.5 MG tablet Take 12.5 mg by mouth daily 10/3/22  Yes Historical Provider, MD   methylPREDNISolone (MEDROL DOSEPACK) 4 MG tablet Take 4 mg by mouth 10/3/22  Yes Historical Provider, MD   ibuprofen (ADVIL;MOTRIN) 600 MG tablet Take 1 tablet by mouth 3 times daily as needed for Pain 10/11/22  Yes Silas Sanders MD   CPAP Machine MISC by Does not apply route   Yes Historical Provider, MD   atorvastatin (LIPITOR) 80 MG tablet Take 1 tablet by mouth daily 10/10/22  Yes Bert Castaneda MD   fenofibrate (TRICOR) 145 MG tablet Take 1 tablet by mouth daily 10/10/22  Yes Bert Castaneda MD   Icosapent Ethyl (VASCEPA) 1 g CAPS capsule Take 2 capsules by mouth 2 times daily 10/10/22  Yes Bert Castaneda MD   metoprolol succinate (TOPROL XL) 25 MG extended release tablet TAKE 1 TABLET BY MOUTH EVERY DAY 9/14/22  Yes Bert Castaneda MD   aspirin (ASPIRIN CHILDRENS) 81 MG chewable tablet Take 1 tablet by mouth daily 4/19/22  Yes Bert Castaneda MD   sucralfate (CARAFATE) 1 GM tablet Take 1 tablet before each meal and then 1 tablet before bed for a total of 4 times daily. 1/18/22  Yes Historical Provider, MD   gabapentin (NEURONTIN) 100 MG capsule  1/14/22  Yes Historical Provider, MD   omeprazole (PRILOSEC) 40 MG delayed release capsule  12/15/21   Historical Provider, MD     Vitals:    01/10/23 1501   BP: 120/64   Pulse: 80   Weight: 261 lb 6.4 oz (118.6 kg)   Height: 5' 1.2\" (1.554 m)      Body mass index is 49.07 kg/m². Wt Readings from Last 3 Encounters:   01/10/23 261 lb 6.4 oz (118.6 kg)   10/11/22 270 lb (122.5 kg)   10/10/22 270 lb (122.5 kg)     Constitutional:  Patient is obese male in no apparent distress. He lost 9 pounds since last visit. Eyes: Pupils are equal no conjunctival pallor noted. NECK: No JVP or thyromegaly  Cardiovascular: Auscultation: Normal S1 and S2. No murmurs or gallops noted. Carotids are negative for bruits. Peripheral pulses: Pedal pulses are 1+. Respiratory:  Respiratory effort is normal. Breath sounds are clear to auscultation. Extremities: Trace pitting edema noted bilaterally. SKIN: Warm and well perfused, no pallor or cyanosis  Neurologic:  Oriented to time, place, and person and non-anxious.  No focal neurological deficit noted. Psychiatric: Normal mood and effect. 11/18/2021 MRI  1. Multilevel degenerative changes of the lumbar spine with mild spinal canal   stenosis at L3-4, L4-5, and L5-S1.   2. Multilevel neural foraminal stenosis as above. 3. Disc extrusion and annular fissure at L3-4. LAB REVIEW:  CBC:   Lab Results   Component Value Date/Time    WBC 9.3 10/11/2022 08:53 PM    HGB 13.7 10/11/2022 08:53 PM    HCT 44.2 10/11/2022 08:53 PM     10/11/2022 08:53 PM     Lipids:   Lab Results   Component Value Date    CHOL 550 10/03/2022    CHOLFAST 321 (H) 03/22/2022    TRIG 1,781 10/03/2022    TRIGLYCFAST 428 (H) 03/22/2022    HDL 37 10/03/2022    [address]        03/22/2022     Renal:   Lab Results   Component Value Date/Time    BUN 15 10/11/2022 08:53 PM    CREATININE 0.7 10/11/2022 08:53 PM     10/11/2022 08:53 PM    K 4.5 10/11/2022 08:53 PM       IMPRESSION and RECOMMENDATIONS:      1. Mixed hyperlipidemia  Assessment & Plan:  Continue Lipitor and fenofibrate and repeat lipids. Continue diet and weight management. 2. MOLINA (obstructive sleep apnea)  Assessment & Plan:  Continue CPAP therapy compliance. Weight loss will help. 3. Obesity (BMI 30-39. 9)  Assessment & Plan:  Lost 9 pounds continue diet and lose more weight. 4. Palpitations  Assessment & Plan:  had a Holter monitor last year was negative for any significant arrhythmias. 5. PAF (paroxysmal atrial fibrillation) ([facility] [address]  Assessment & Plan:  Had not had much recurrence since recent episode of March 2022. Continue optimization of CPAP. Continue Toprol. Continue current cardiovascular medications which have been reviewed and discussed individually with you. Have lipids and LFT done. Continue to lose weight. Appropriate prescriptions if needed on this visit are addressed. After visit summery is provided. Questions answered and patient verbalizes understanding. Follow up in 6 months,  sooner if needed.     Jake Correa MD, 1/10/2023 3:28 PM     Please note this report has been partially produced using speech recognition software and may contain errors related to that system including errors in grammar, punctuation, and spelling, as well as words and phrases that may be inappropriate. If there are any questions or concerns please feel free to contact the dictating provider for clarification.

## 2023-01-11 LAB
CHOLESTEROL, TOTAL: 242 MG/DL
CHOLESTEROL/HDL RATIO: ABNORMAL
HDLC SERPL-MCNC: 52 MG/DL (ref 35–70)
LDL CHOLESTEROL CALCULATED: ABNORMAL
NONHDLC SERPL-MCNC: ABNORMAL MG/DL
TRIGL SERPL-MCNC: 411 MG/DL
VLDLC SERPL CALC-MCNC: ABNORMAL MG/DL

## 2023-01-13 LAB
A/G RATIO: ABNORMAL
ALBUMIN SERPL-MCNC: 4.8 G/DL
ALP BLD-CCNC: 114 U/L
ALT SERPL-CCNC: 93 U/L
AST SERPL-CCNC: 51 U/L
BILIRUB SERPL-MCNC: 0.7 MG/DL (ref 0.1–1.4)
BILIRUBIN DIRECT: 0.2 MG/DL
BILIRUBIN, INDIRECT: ABNORMAL
GLOBULIN: ABNORMAL
PROTEIN TOTAL: 7.7 G/DL

## 2023-07-18 ENCOUNTER — HOSPITAL ENCOUNTER (EMERGENCY)
Age: 53
Discharge: HOME | End: 2023-07-18
Payer: COMMERCIAL

## 2023-07-18 DIAGNOSIS — H16.9 KERATITIS: Primary | ICD-10-CM

## 2023-07-18 DIAGNOSIS — H10.9 CONJUNCTIVITIS OF BOTH EYES, UNSPECIFIED CONJUNCTIVITIS TYPE: ICD-10-CM

## 2023-07-18 PROCEDURE — 6370000000 HC RX 637 (ALT 250 FOR IP): Performed by: NURSE PRACTITIONER

## 2023-07-18 PROCEDURE — 99283 EMERGENCY DEPT VISIT LOW MDM: CPT

## 2023-07-18 RX ORDER — TETRACAINE HYDROCHLORIDE 5 MG/ML
2 SOLUTION OPHTHALMIC ONCE
Status: DISCONTINUED | OUTPATIENT
Start: 2023-07-18 | End: 2023-07-18 | Stop reason: HOSPADM

## 2023-07-18 RX ORDER — OXYCODONE HYDROCHLORIDE AND ACETAMINOPHEN 5; 325 MG/1; MG/1
1 TABLET ORAL ONCE
Status: COMPLETED | OUTPATIENT
Start: 2023-07-18 | End: 2023-07-18

## 2023-07-18 RX ADMIN — OXYCODONE AND ACETAMINOPHEN 1 TABLET: 5; 325 TABLET ORAL at 14:13

## 2023-07-18 ASSESSMENT — PAIN SCALES - GENERAL: PAINLEVEL_OUTOF10: 8

## 2023-07-18 NOTE — ED PROVIDER NOTES
[address] ENCOUNTER        Pt Name: Gary Nugent  MRN: [id number]  Betty Divers 1970  Date of evaluation: 7/18/2023  Provider: KAREN Anguiano CNP  PCP: No primary care provider on file. RAQUEL. I have evaluated this patient. Triage CHIEF COMPLAINT       Chief Complaint   Patient presents with    Eye Problem    Eye Drainage     bilateral eye drainage and eye swelling, feeling like there is something in his right eye         HISTORY OF PRESENT ILLNESS      Chief Complaint: bilateral eye pain    Gary Nugent is a 52 y.o. male who presents for evaluation of bilateral eye pain, redness, photophobia for the last 2 days. Patient reports he was in [address] [place] [address] and 2 days ago was at [facility] when he was admiring a tree with food on it which he thought was a go off a tree but subsequently found out was a manchineel tree. After touching the leaves and fruit he wiped his face as he was sweaty and immediately started feeling an intense burning sensation on his skin and eyes. He states \"it feels like someone put a blow torch to my face\". He went to the local emergency department and ultimately had topical anesthetics applied to his eyes and was given pain medication and discharge. He was advised symptoms should improve over a couple of days. Since then he has had increasing redness, pain, swelling of his eyes with decreasing vision. He flew home today and came immediately to the ED. Does not wear glasses. Reports blurring of vision which is significantly worse over the right eye. Nursing Notes were all reviewed and agreed with or any disagreements were addressed in the HPI. REVIEW OF SYSTEMS     Pertinent ROS as noted in HPI. PAST MEDICAL HISTORY   No past medical history on file. SURGICAL HISTORY   No past surgical history on file.     CURRENTMEDICATIONS       There are no

## 2023-07-18 NOTE — ED NOTES
Patient discharging home, AVS reviewed with no questions at this time. Patient instrcuted to follow up per discharge instructions and to return for worsening symptoms. Respirations equal and unlabored, skin WD.        Baldomero Quinteros RN  07/18/23 8928

## 2023-07-18 NOTE — ED NOTES
Patient see's 20/100 with left eye, nothing with the right eye, and 20/100 with both eyes     Naomy Gates  07/18/23 9202

## 2023-07-18 NOTE — ED NOTES
[address] Dr Antwan Jones  07/18/23 1440    1451 Dr Clifford Rodriguez returned call      Loyd Trejo  07/18/23 7389

## 2023-10-30 ENCOUNTER — OFFICE VISIT (OUTPATIENT)
Dept: CARDIOLOGY CLINIC | Age: 53
End: 2023-10-30
Payer: MEDICAID

## 2023-10-30 ENCOUNTER — NURSE ONLY (OUTPATIENT)
Dept: CARDIOLOGY CLINIC | Age: 53
End: 2023-10-30

## 2023-10-30 VITALS
SYSTOLIC BLOOD PRESSURE: 130 MMHG | HEART RATE: 65 BPM | HEIGHT: 70 IN | DIASTOLIC BLOOD PRESSURE: 88 MMHG | BODY MASS INDEX: 39.91 KG/M2 | WEIGHT: 278.8 LBS

## 2023-10-30 DIAGNOSIS — G47.33 OSA (OBSTRUCTIVE SLEEP APNEA): ICD-10-CM

## 2023-10-30 DIAGNOSIS — R00.2 PALPITATIONS: Primary | ICD-10-CM

## 2023-10-30 DIAGNOSIS — R07.89 ATYPICAL CHEST PAIN: ICD-10-CM

## 2023-10-30 DIAGNOSIS — E78.2 MIXED HYPERLIPIDEMIA: ICD-10-CM

## 2023-10-30 DIAGNOSIS — E66.9 OBESITY (BMI 30-39.9): ICD-10-CM

## 2023-10-30 PROBLEM — E66.01 CLASS 3 SEVERE OBESITY IN ADULT (HCC): Status: RESOLVED | Noted: 2022-04-19 | Resolved: 2023-10-30

## 2023-10-30 PROBLEM — E66.01 CLASS 3 SEVERE OBESITY IN ADULT (HCC): Status: ACTIVE | Noted: 2022-04-19

## 2023-10-30 PROBLEM — E66.813 CLASS 3 SEVERE OBESITY IN ADULT (HCC): Status: RESOLVED | Noted: 2022-04-19 | Resolved: 2023-10-30

## 2023-10-30 PROBLEM — E66.813 CLASS 3 SEVERE OBESITY IN ADULT (HCC): Status: ACTIVE | Noted: 2022-04-19

## 2023-10-30 PROCEDURE — 99214 OFFICE O/P EST MOD 30 MIN: CPT | Performed by: INTERNAL MEDICINE

## 2023-10-30 PROCEDURE — 93000 ELECTROCARDIOGRAM COMPLETE: CPT | Performed by: INTERNAL MEDICINE

## 2023-10-30 RX ORDER — ICOSAPENT ETHYL 1000 MG/1
2 CAPSULE ORAL 2 TIMES DAILY
Qty: 180 CAPSULE | Refills: 3 | Status: SHIPPED | OUTPATIENT
Start: 2023-10-30

## 2023-10-30 RX ORDER — ATORVASTATIN CALCIUM 80 MG/1
80 TABLET, FILM COATED ORAL DAILY
Qty: 90 TABLET | Refills: 3 | Status: SHIPPED | OUTPATIENT
Start: 2023-10-30

## 2023-10-30 RX ORDER — FENOFIBRATE 145 MG/1
145 TABLET, COATED ORAL DAILY
Qty: 90 TABLET | Refills: 3 | Status: SHIPPED | OUTPATIENT
Start: 2023-10-30

## 2023-10-30 NOTE — ASSESSMENT & PLAN NOTE
Patient reports palpitations are occurring once or twice a week we will do a 30-day cardiac event monitor to evaluate further as 48-hour monitor was negative for any significant arrhythmias.

## 2023-10-30 NOTE — PATIENT INSTRUCTIONS
Continue current cardiovascular medications which have been reviewed and discussed individually with you. Counseled for 30 minutes a day of moderate intensity aerobic exercise like activity. Weight management clinic referral. 30 days of cardiac event monitoring. Appropriate prescriptions if needed on this visit are addressed. After visit summery is provided. Questions answered and patient verbalizes understanding. Follow up in 6 months with lipids and direct LDL,  sooner if needed.

## 2023-10-30 NOTE — PROGRESS NOTES
Jocelyne Sierra  1970  JABARI Tracy      Chief Complaint   Patient presents with    Hyperlipidemia    Atrial Fibrillation    Follow-up     Complains of chest pain and fatigue , chest gets tight and feels heart beating to fast as well as SOB and swelling in both legs       Chief complaint and HPI:  Jocelyne Sierra  is a 52 y.o. male following up for severe mixed hyperlipidemia and has obesity and obstructive sleep apnea has been using CPAP therapy which is helping him he states and he is not feeling sleepy while he is driving anymore and he is getting good sleep. He continues to have some atypical chest pains and he says he is under a lot of stress not able to find a job has chronic back problems and continues to gain weight and admits that he is not good and dietary restrictions and eats sweets etc. but remains compliant to medications. He has never been a smoker. Today he is by himself speaks [place] understandable and sometimes I had repeated multiple times. Last visit he had a  with him. Medications are reconciled. He complains about palpitations he says heart is racing and happens only once or twice a week last for several minutes. Hematological he should call 911 if this happens. He denies any dizziness, lightheadedness syncope or near syncope but feels chest tightness when that happens. Had work-up last year I have reviewed noninvasive evaluation with him. He denies using any energy drinks or any other excessive caffeine etc.  He says he is mind is always racing although CPAP is helping to sleep but he is not sleeping because of the things going around with him with his back and not able to work there is too much stress in life he says. He is also concerned about gaining weight. Rest of the Cardiovascular system review is otherwise unchanged from prior encounter.   Past medical history:  has a past medical history of Atypical chest pain, GERD (gastroesophageal reflux disease),

## 2023-10-30 NOTE — ASSESSMENT & PLAN NOTE
Patient is on triple therapy despite that his triglycerides are more than thousand and he seems to be compliant to medications. Aggressive dietary modification and weight management will help. I have counseled him at length and he admits he eats too much sweets. We will repeat lipids prior to next office visit. Patient is asked to bring all his medications as well.

## 2023-10-30 NOTE — ASSESSMENT & PLAN NOTE
Very atypical by description had a good exercise tolerance last year and stress test was negative and today's EKG is also normal.  I doubt his symptoms are cardiac most likely it is related to anxiety and stress he is under.

## 2023-10-30 NOTE — ASSESSMENT & PLAN NOTE
Patient has gained 16 pounds since last visit I will refer him to weight management clinic for further evaluation and assistance for him to lose weight. I have counseled him for calorie restriction and dietary modification and healthy lifestyles.

## 2023-12-06 DIAGNOSIS — R00.2 PALPITATIONS: Primary | ICD-10-CM

## 2023-12-07 ENCOUNTER — TELEPHONE (OUTPATIENT)
Dept: CARDIOLOGY CLINIC | Age: 53
End: 2023-12-07

## 2023-12-07 NOTE — TELEPHONE ENCOUNTER
Left  to stephen call regarding cardiac monitor report. Cardiac event monitor is done for 30 days from 11/4/2023 to 12/3/2023 to evaluate palpitations. 16 patient triggered transmissions are available for interpretation. Patient is in normal sinus rhythm throughout the study average rate 73 bpm minimum rate was 58 and maximum rate was 104 bpm.  Patient reported feeling tired and fatigued during normal rate and rhythm. Intermittent bundle branch block is noted which does not appear to be rate related. No other significant arrhythmias noted. Antonieta Mitchell

## 2024-05-10 ENCOUNTER — HOSPITAL ENCOUNTER (OUTPATIENT)
Age: 54
Discharge: HOME | End: 2024-05-10
Payer: MEDICAID

## 2024-05-10 DIAGNOSIS — E78.2 MIXED HYPERLIPIDEMIA: ICD-10-CM

## 2024-05-10 LAB — LDLC SERPL-MCNC: 119 MG/DL

## 2024-05-10 PROCEDURE — 83721 ASSAY OF BLOOD LIPOPROTEIN: CPT

## 2024-05-10 PROCEDURE — 36415 COLL VENOUS BLD VENIPUNCTURE: CPT

## 2024-05-13 ENCOUNTER — OFFICE VISIT (OUTPATIENT)
Dept: CARDIOLOGY CLINIC | Age: 54
End: 2024-05-13
Payer: MEDICAID

## 2024-05-13 VITALS
HEIGHT: 70 IN | HEART RATE: 76 BPM | DIASTOLIC BLOOD PRESSURE: 74 MMHG | SYSTOLIC BLOOD PRESSURE: 114 MMHG | WEIGHT: 276 LBS | BODY MASS INDEX: 39.51 KG/M2

## 2024-05-13 DIAGNOSIS — I48.0 PAF (PAROXYSMAL ATRIAL FIBRILLATION) (HCC): ICD-10-CM

## 2024-05-13 DIAGNOSIS — G47.33 OSA (OBSTRUCTIVE SLEEP APNEA): ICD-10-CM

## 2024-05-13 DIAGNOSIS — E78.2 MIXED HYPERLIPIDEMIA: ICD-10-CM

## 2024-05-13 DIAGNOSIS — E66.9 OBESITY (BMI 30-39.9): Primary | ICD-10-CM

## 2024-05-13 PROCEDURE — 99214 OFFICE O/P EST MOD 30 MIN: CPT | Performed by: INTERNAL MEDICINE

## 2024-05-13 PROCEDURE — G8427 DOCREV CUR MEDS BY ELIG CLIN: HCPCS | Performed by: INTERNAL MEDICINE

## 2024-05-13 PROCEDURE — 1036F TOBACCO NON-USER: CPT | Performed by: INTERNAL MEDICINE

## 2024-05-13 PROCEDURE — G8417 CALC BMI ABV UP PARAM F/U: HCPCS | Performed by: INTERNAL MEDICINE

## 2024-05-13 PROCEDURE — 3017F COLORECTAL CA SCREEN DOC REV: CPT | Performed by: INTERNAL MEDICINE

## 2024-05-13 RX ORDER — CYCLOBENZAPRINE HCL 10 MG
10 TABLET ORAL 3 TIMES DAILY PRN
COMMUNITY

## 2024-05-13 RX ORDER — BENZONATATE 200 MG/1
200 CAPSULE ORAL 3 TIMES DAILY PRN
COMMUNITY

## 2024-05-13 RX ORDER — FENOFIBRATE 145 MG/1
145 TABLET, COATED ORAL DAILY
Qty: 90 TABLET | Refills: 3 | Status: SHIPPED | OUTPATIENT
Start: 2024-05-13

## 2024-05-13 RX ORDER — ASPIRIN 81 MG
TABLET, DELAYED RELEASE (ENTERIC COATED) ORAL
COMMUNITY

## 2024-05-13 RX ORDER — MELOXICAM 15 MG/1
15 TABLET ORAL DAILY
COMMUNITY

## 2024-05-13 RX ORDER — FERROUS SULFATE 325(65) MG
325 TABLET ORAL
COMMUNITY

## 2024-05-13 RX ORDER — ATORVASTATIN CALCIUM 80 MG/1
80 TABLET, FILM COATED ORAL DAILY
Qty: 90 TABLET | Refills: 3 | Status: SHIPPED | OUTPATIENT
Start: 2024-05-13

## 2024-05-13 RX ORDER — AMOXICILLIN 250 MG
1 CAPSULE ORAL DAILY
COMMUNITY

## 2024-05-13 RX ORDER — HYDROXYZINE HYDROCHLORIDE 25 MG/1
25 TABLET, FILM COATED ORAL 3 TIMES DAILY PRN
COMMUNITY

## 2024-05-13 NOTE — ASSESSMENT & PLAN NOTE
Patient has lost 2 pounds counseled to make further changes in his diet specifically he is used to snacking quite a bit in between meals.  Calorie restriction to lose weight is counseled.  Walk and exercise is much as possible.

## 2024-05-13 NOTE — ASSESSMENT & PLAN NOTE
LDL has come down to 119 from 140 in the past current combination of atorvastatin and fenofibrate.  Continue the same.  Counseled to diet and exercise to lose weight.

## 2024-05-13 NOTE — PATIENT INSTRUCTIONS
Continue current cardiovascular medications which have been reviewed and discussed individually with you. Counseled to diet and lose weight.    Appropriate prescriptions if needed on this visit are addressed. After visit summery is provided.   Questions answered and patient verbalizes understanding. Follow up in 12 months,  sooner if needed.

## 2024-05-13 NOTE — PROGRESS NOTES
Yonathan Morrow  1970  Cyndie Downey PA      Chief Complaint   Patient presents with    Hyperlipidemia    Palpitations     OV for 6 month check. Pt denies any chest pain,SOB,dizziness,swelling to ankles. Some palpitations.     Chief complaint and HPI:  Yonathan Morrow  is a 53 y.o. male following up for hypertension, obesity and has obstructive sleep apnea and chronic back pain denies any cardiac symptoms today.  He is not able to exercise much because it hurts to walk it hurts to stand up or sit continuously.  He is compliant to CPAP therapy follows up with pulmonary however does not feel that he has more energy since he has been using it.  Medications are reviewed and reconciled.    Rest of the Cardiovascular system review is otherwise unchanged from prior encounter.  Past medical history:  has a past medical history of Atypical chest pain, GERD (gastroesophageal reflux disease), H/O Doppler echocardiogram, History of cardiac monitoring, History of exercise stress test, History of Holter monitoring, Hyperlipidemia, Hypertriglyceridemia, and Palpitations.  Past surgical history:  has no past surgical history on file.  Social History:   Social History     Tobacco Use    Smoking status: Never    Smokeless tobacco: Never   Substance Use Topics    Alcohol use: Never     Family history: family history includes Cancer in his mother; Diabetes in his father.  ALLERGIES:  Patient has no known allergies.  Prior to Admission medications    Medication Sig Start Date End Date Taking? Authorizing Provider   cyclobenzaprine (FLEXERIL) 10 MG tablet Take 1 tablet by mouth 3 times daily as needed for Muscle spasms   Yes ProviderJesus MD   hydrOXYzine HCl (ATARAX) 25 MG tablet Take 1 tablet by mouth 3 times daily as needed for Itching   Yes ProviderJesus MD   ferrous sulfate (IRON 325) 325 (65 Fe) MG tablet Take 1 tablet by mouth daily (with breakfast)   Yes ProviderJesus MD   meloxicam (MOBIC) 15 MG

## 2024-05-17 ENCOUNTER — TRANSCRIBE ORDERS (OUTPATIENT)
Dept: ADMINISTRATIVE | Age: 54
End: 2024-05-17

## 2024-05-17 DIAGNOSIS — I10 ESSENTIAL HYPERTENSION: Primary | ICD-10-CM

## 2024-05-17 DIAGNOSIS — E78.1 HYPERTRIGLYCERIDEMIA: ICD-10-CM

## 2024-05-30 ENCOUNTER — HOSPITAL ENCOUNTER (OUTPATIENT)
Dept: DIABETES SERVICES | Age: 54
Setting detail: THERAPIES SERIES
Discharge: HOME | End: 2024-05-30
Payer: MEDICAID

## 2024-05-30 PROCEDURE — 97802 MEDICAL NUTRITION INDIV IN: CPT

## 2024-05-31 VITALS — HEIGHT: 70 IN | BODY MASS INDEX: 39.6 KG/M2

## 2024-05-31 NOTE — PROGRESS NOTES
Prescription: Follow balanced plate at meals times as well as consume 3 balanced meals daily-meals are to include fruits, veggies and lean meats. Increase activity as able.     Nutrition Interventions: Increase intake of fruits and vegetables, consume lean meats with limiting red meats, cook with canola oil, peanut oil, avocado oil, etc in place of lard, follow balanced plate method at meals, consume foods that are baked, broiled, steamed or grilled, increase activity as able.     Nutrition related goals: As above    Adherence/barriers to goals: None noted-pt motivated and willing to make changes. Pt does speak [place]an- was utilized.     Primary learner: patient  Education materials provided: balanced plate handouts, lipid handouts from AND care manual   Method of education:   Explanation  Handouts       Response to education:    Verbalized understanding            Rec/plan:             Expect good compliance with information provided. Pt is motivated to make changes and reports family will assist. Encouraged to call for further questions or if follow up appointment necessary.     Mariama Cleaning RD, LD

## 2024-08-06 ENCOUNTER — HOSPITAL ENCOUNTER (OUTPATIENT)
Dept: ULTRASOUND IMAGING | Age: 54
Discharge: HOME | End: 2024-08-06
Payer: MEDICAID

## 2024-08-06 DIAGNOSIS — M79.662 PAIN OF LEFT LOWER LEG: ICD-10-CM

## 2024-08-06 DIAGNOSIS — R06.02 SHORTNESS OF BREATH: ICD-10-CM

## 2024-08-06 PROCEDURE — 93971 EXTREMITY STUDY: CPT

## 2025-06-06 ENCOUNTER — HOSPITAL ENCOUNTER (EMERGENCY)
Age: 55
Discharge: HOME | End: 2025-06-06
Attending: EMERGENCY MEDICINE
Payer: COMMERCIAL

## 2025-06-06 VITALS
RESPIRATION RATE: 24 BRPM | HEART RATE: 70 BPM | TEMPERATURE: 98.6 F | OXYGEN SATURATION: 96 % | DIASTOLIC BLOOD PRESSURE: 89 MMHG | SYSTOLIC BLOOD PRESSURE: 150 MMHG

## 2025-06-06 DIAGNOSIS — K02.9 PAIN DUE TO DENTAL CARIES: Primary | ICD-10-CM

## 2025-06-06 DIAGNOSIS — K04.7 DENTAL INFECTION: ICD-10-CM

## 2025-06-06 PROCEDURE — 6370000000 HC RX 637 (ALT 250 FOR IP): Performed by: EMERGENCY MEDICINE

## 2025-06-06 PROCEDURE — 99283 EMERGENCY DEPT VISIT LOW MDM: CPT

## 2025-06-06 RX ORDER — PENICILLIN V POTASSIUM 500 MG/1
500 TABLET, FILM COATED ORAL ONCE
Status: COMPLETED | OUTPATIENT
Start: 2025-06-06 | End: 2025-06-06

## 2025-06-06 RX ORDER — PENICILLIN V POTASSIUM 500 MG/1
500 TABLET, FILM COATED ORAL 3 TIMES DAILY
Qty: 30 TABLET | Refills: 0 | Status: SHIPPED | OUTPATIENT
Start: 2025-06-06 | End: 2025-06-16

## 2025-06-06 RX ORDER — HYDROCODONE BITARTRATE AND ACETAMINOPHEN 5; 325 MG/1; MG/1
1 TABLET ORAL ONCE
Status: COMPLETED | OUTPATIENT
Start: 2025-06-06 | End: 2025-06-06

## 2025-06-06 RX ORDER — NAPROXEN 500 MG/1
500 TABLET ORAL 2 TIMES DAILY PRN
Qty: 20 TABLET | Refills: 0 | Status: SHIPPED | OUTPATIENT
Start: 2025-06-06 | End: 2025-06-16

## 2025-06-06 RX ORDER — NAPROXEN 500 MG/1
500 TABLET ORAL ONCE
Status: COMPLETED | OUTPATIENT
Start: 2025-06-06 | End: 2025-06-06

## 2025-06-06 RX ADMIN — PENICILLIN V POTASSIUM 500 MG: 500 TABLET, FILM COATED ORAL at 19:28

## 2025-06-06 RX ADMIN — NAPROXEN 500 MG: 500 TABLET ORAL at 19:28

## 2025-06-06 RX ADMIN — HYDROCODONE BITARTRATE AND ACETAMINOPHEN 1 TABLET: 5; 325 TABLET ORAL at 19:28

## 2025-06-06 ASSESSMENT — ENCOUNTER SYMPTOMS
SORE THROAT: 0
ABDOMINAL PAIN: 0
SINUS PRESSURE: 0
SHORTNESS OF BREATH: 0
RHINORRHEA: 0
COUGH: 0
COLOR CHANGE: 0
BACK PAIN: 0
EYE DISCHARGE: 0
EYE ITCHING: 0

## 2025-06-06 ASSESSMENT — PAIN DESCRIPTION - LOCATION: LOCATION: TEETH

## 2025-06-06 ASSESSMENT — PAIN - FUNCTIONAL ASSESSMENT
PAIN_FUNCTIONAL_ASSESSMENT: ACTIVITIES ARE NOT PREVENTED
PAIN_FUNCTIONAL_ASSESSMENT: NONE - DENIES PAIN
PAIN_FUNCTIONAL_ASSESSMENT: 0-10

## 2025-06-06 ASSESSMENT — PAIN DESCRIPTION - ORIENTATION: ORIENTATION: MID

## 2025-06-06 ASSESSMENT — PAIN DESCRIPTION - DESCRIPTORS: DESCRIPTORS: DISCOMFORT

## 2025-06-06 ASSESSMENT — PAIN SCALES - GENERAL
PAINLEVEL_OUTOF10: 8
PAINLEVEL_OUTOF10: 0

## 2025-06-06 NOTE — ED PROVIDER NOTES
The history is provided by the patient.   Dental Pain  Location:  Upper  Quality:  Aching, dull, pulsating and pressure-like  Severity:  Severe  Onset quality:  Sudden  Timing:  Constant  Progression:  Worsening  Chronicity:  New  Context comment:  Patient has poor dentitiion and has had infections in the past  Relieved by:  Nothing  Worsened by:  Nothing  Ineffective treatments:  None tried  Associated symptoms: facial pain, gum swelling and oral bleeding    Associated symptoms: no congestion, no difficulty swallowing and no drooling        Review of Systems   Constitutional:  Negative for activity change and appetite change.   HENT:  Negative for congestion, drooling, rhinorrhea, sinus pressure and sore throat.    Eyes:  Negative for discharge and itching.   Respiratory:  Negative for cough and shortness of breath.    Cardiovascular:  Negative for chest pain, palpitations and leg swelling.   Gastrointestinal:  Negative for abdominal pain.   Genitourinary:  Negative for decreased urine volume, difficulty urinating and urgency.   Musculoskeletal:  Negative for arthralgias and back pain.   Skin:  Negative for color change, pallor, rash and wound.   Neurological:  Negative for dizziness, speech difficulty and light-headedness.   Hematological:  Negative for adenopathy.   Psychiatric/Behavioral:  Negative for agitation, behavioral problems, confusion and decreased concentration.        Family History   Problem Relation Age of Onset    Cancer Mother     Diabetes Father      Social History     Socioeconomic History    Marital status:      Spouse name: Not on file    Number of children: Not on file    Years of education: Not on file    Highest education level: Not on file   Occupational History    Not on file   Tobacco Use    Smoking status: Never    Smokeless tobacco: Never   Vaping Use    Vaping status: Never Used   Substance and Sexual Activity    Alcohol use: Never    Drug use: Never    Sexual activity: Not on  ProviderJesus MD   meloxicam (MOBIC) 15 MG tablet Take 1 tablet by mouth daily    Jesus Corcoran MD   benzonatate (TESSALON) 200 MG capsule Take 1 capsule by mouth 3 times daily as needed for Cough    Jesus Corcoran MD   senna-docusate (PERICOLACE) 8.6-50 MG per tablet Take 1 tablet by mouth daily    Jesus Corcoran MD   Docusate Sodium 100 MG TABS Take by mouth    Jesus Corcoran MD   atorvastatin (LIPITOR) 80 MG tablet Take 1 tablet by mouth daily 5/13/24   Livia Simpson MD   fenofibrate (TRICOR) 145 MG tablet Take 1 tablet by mouth daily 5/13/24   Livia Simpson MD   albuterol sulfate HFA (PROVENTIL;VENTOLIN;PROAIR) 108 (90 Base) MCG/ACT inhaler Inhale 1 puff into the lungs every 6 hours as needed 10/3/22   Jesus Corcoran MD   CPAP Machine MISC by Does not apply route    Jesus Corcoran MD       BP (!) 150/89   Pulse 70   Temp 98.6 °F (37 °C) (Oral)   Resp 24   SpO2 96%     Physical Exam  Vitals and nursing note reviewed.   Constitutional:       Appearance: He is well-developed.   HENT:      Head: Normocephalic and atraumatic.      Right Ear: External ear normal.      Left Ear: External ear normal.      Nose: Nose normal.      Mouth/Throat:        Comments: Pink is area of gum redness and swelling with mild bleeding . No abscess   Eyes:      Conjunctiva/sclera: Conjunctivae normal.      Pupils: Pupils are equal, round, and reactive to light.   Cardiovascular:      Rate and Rhythm: Normal rate and regular rhythm.      Heart sounds: Normal heart sounds.   Pulmonary:      Effort: Pulmonary effort is normal.      Breath sounds: Normal breath sounds.   Abdominal:      General: Bowel sounds are normal.      Palpations: Abdomen is soft.   Musculoskeletal:         General: Normal range of motion.      Cervical back: Normal range of motion and neck supple.   Skin:     General: Skin is warm and dry.   Neurological:      Mental Status: He is alert and oriented to

## 2025-06-06 NOTE — DISCHARGE INSTRUCTIONS
[facility]  Website: [email or web address]  Email: [email or web address]  Phone Number: [phone]  Fax Number: [phone]  Hours: M,T,W 8am-5pm, Th Closed, F 8am-2pm  Cost: Varies with service  Insurance: Most insurance plans accepted.  Address: [address] [place] [address] Pediatric [facility] - Zachariah Pena DDS  Website: [email or web address]  Phone Number: [phone]  Hours: M-Th 8am-5pm  Cost: Varies with service provided  Insurance: All Medicaid insurance plans and some private insurance accepted  Address: [address] [place] [address] Dental  Website: [email or web address]  Phone Number: [phone]  Hours: T-F 8am-5pm  Cost: No sliding fee; varies with service provided  Insurance: Medicaid, Medicaid insurance plans and most private insurances  Address: [address] [place] [address]  Website: [email or web address]  Phone Number: Children: [phone] Adults: [phone]  Hours: Hours vary by service, typically M-F 8:30 am-5 pm; some evenings and weekends  Cost: Sliding fee scale; no one is turned away for inability to pay  Insurance: Medicaid, Medicaid insurance plans and most private insurance accepted  Address: [address] [place] [address]  Website: [email or web address]  Phone Number: [phone]  Hours: M-F 8am-4:30pm?Dental Clinic: M,W 9am-7pm and T,Th 9am-5pm  Cost: Sliding Fee Scale  Insurance: Medicaid, all Medicaid insurance plans, private insurance and self-pay patients  Address: [address] [place], [place] [id number]      [facility]  Website: [email or web address]  Phone Number: [phone]  Hours: M-F 9 am-6 pm  Cost: Varies with service; payment plans available  Insurance: Medicaid insurance plans and most insurances accepted  Address: [address]  [address]      [facility]  Website: [email or web address]  Phone Number: [phone]  Hours: M-Th 9am-5pm, F 9am-4pm (evening hours available by appointment)  Cost: Varies with service; payment plan options available  Insurance: Medicaid, Medicaid insurance plans and most insurances accepted  Address: [address] [place] [address])[place] [id number]      [facility]  Website: [email or web address]  Phone Number: [phone]  Hours: M-F 9am-5pm  Cost: None  Insurance: N/A  Address: [address] [place] [id number]       Brant Gonzalez DDS   Phone Number: [phone]  Hours: T-F 9am-5pm; closed 12:30-1:30 for lunch  Cost: Varies depending on service provided  Insurance: Medicaid, Medicaid insurance plans and private insurance  Address: [address] [place] [address]       [facility]  Website: [email or web address]  Phone Number: [phone]  Hours: M-F 7:30am-7:30pm, Sat 7:30pm-1:30pm  Cost: $19 exam and x-ray for new patients without insurance; [place] Gold Plan reduced fee program for individuals without insurance  Insurance: Medicaid, all Medicaid insurance plans and most private insurance plans accepted  Address: [address] [place] [address]      [facility]  Website: [email or web address]  Phone Number: [phone]  Hours: M-F 9am-5pm  Cost: Varies depending on the service provided  Insurance: Medicaid, Medicaid insurance plans, Medicare and most private insurance accepted